# Patient Record
Sex: FEMALE | Race: WHITE
[De-identification: names, ages, dates, MRNs, and addresses within clinical notes are randomized per-mention and may not be internally consistent; named-entity substitution may affect disease eponyms.]

---

## 2017-06-29 ENCOUNTER — HOSPITAL ENCOUNTER (OUTPATIENT)
Dept: HOSPITAL 65 - LAB | Age: 77
Discharge: HOME | End: 2017-06-29
Attending: INTERNAL MEDICINE
Payer: MEDICARE

## 2017-06-29 DIAGNOSIS — Z13.220: Primary | ICD-10-CM

## 2017-06-29 DIAGNOSIS — Z13.29: ICD-10-CM

## 2017-06-29 DIAGNOSIS — Z85.3: ICD-10-CM

## 2017-06-29 DIAGNOSIS — I10: ICD-10-CM

## 2017-06-29 LAB
ALP INTEST CFR SERPL: 40 U/L (ref 50–136)
ALT SERPL-CCNC: 60 U/L (ref 12–78)
AST SERPL-CCNC: 38 U/L (ref 0–35)
BASOPHILS # BLD AUTO: 0.1 10^3/UL (ref 0–0.1)
BASOPHILS NFR BLD AUTO: 1 % (ref 0–0.2)
CALCIUM SERPL-MCNC: 9.9 MG/DL (ref 8.4–10.5)
CHOLEST SERPL-MCNC: 270 MG/DL (ref 120–240)
CO2 BLDA-SCNC: 35.5 MMOL/L (ref 20–32)
EOSINOPHIL # BLD AUTO: 0.2 10^3/UL (ref 0–0.2)
EOSINOPHIL NFR BLD AUTO: 2.9 % (ref 0–5)
ERYTHROCYTE [DISTWIDTH] IN BLOOD BY AUTOMATED COUNT: 13.5 % (ref 11.5–14.5)
GLUCOSE PRE 100 G GLC PO SERPL-MCNC: 112 MG/DL (ref 70–110)
HCT VFR BLD AUTO: 48.1 % (ref 36–46)
HDLC SERPL-MCNC: 60 MG/DL (ref 32–96)
HGB BLD-MCNC: 15.9 G/DL (ref 12–15)
LYMPHOCYTES # BLD AUTO: 2 10^3/UL (ref 1–4.8)
LYMPHOCYTES NFR BLD AUTO: 32.7 % (ref 24–44)
MCH RBC QN AUTO: 28.3 PG (ref 26–34)
MCHC RBC AUTO-ENTMCNC: 33.1 G/DL (ref 33–37)
MCV RBC AUTO: 85.6 FL (ref 78–100)
MONOCYTES # BLD AUTO: 0.6 10^3/UL (ref 0.3–0.8)
MONOCYTES NFR BLD AUTO: 9.7 % (ref 5–12)
NEUTROPHILS # BLD AUTO: 3.3 10^3/UL (ref 1.8–7.7)
NEUTROPHILS NFR BLD AUTO: 53.5 % (ref 41–85)
PLATELET # BLD AUTO: 281 10^3/UL (ref 150–400)
PMV BLD AUTO: 9.8 FL (ref 7.8–11)
WBC # BLD AUTO: 6.2 10^3/UL (ref 4.5–11)

## 2017-06-29 PROCEDURE — 85025 COMPLETE CBC W/AUTO DIFF WBC: CPT

## 2017-06-29 PROCEDURE — 80061 LIPID PANEL: CPT

## 2017-06-29 PROCEDURE — 84439 ASSAY OF FREE THYROXINE: CPT

## 2017-06-29 PROCEDURE — 36415 COLL VENOUS BLD VENIPUNCTURE: CPT

## 2017-06-29 PROCEDURE — 84443 ASSAY THYROID STIM HORMONE: CPT

## 2017-06-29 PROCEDURE — 93005 ELECTROCARDIOGRAM TRACING: CPT

## 2017-06-29 PROCEDURE — 81002 URINALYSIS NONAUTO W/O SCOPE: CPT

## 2017-06-29 PROCEDURE — 80053 COMPREHEN METABOLIC PANEL: CPT

## 2017-06-29 PROCEDURE — 83036 HEMOGLOBIN GLYCOSYLATED A1C: CPT

## 2017-07-03 LAB
APPEARANCE UR: CLEAR
BILIRUB UR STRIP.AUTO-MCNC: NEGATIVE MG/DL
COLOR UR: YELLOW
UROBILINOGEN UR QL STRIP.AUTO: NORMAL

## 2017-09-05 ENCOUNTER — HOSPITAL ENCOUNTER (OUTPATIENT)
Dept: HOSPITAL 65 - RAD | Age: 77
Discharge: HOME | End: 2017-09-05
Attending: INTERNAL MEDICINE
Payer: MEDICARE

## 2017-09-05 VITALS — HEIGHT: 60 IN | WEIGHT: 130 LBS | BODY MASS INDEX: 25.52 KG/M2

## 2017-09-05 DIAGNOSIS — R94.31: ICD-10-CM

## 2017-09-05 DIAGNOSIS — J44.9: Primary | ICD-10-CM

## 2017-09-05 DIAGNOSIS — I25.10: ICD-10-CM

## 2017-09-05 PROCEDURE — A9500 TC99M SESTAMIBI: HCPCS

## 2017-09-05 PROCEDURE — 93307 TTE W/O DOPPLER COMPLETE: CPT

## 2017-09-05 PROCEDURE — 93017 CV STRESS TEST TRACING ONLY: CPT

## 2017-09-05 PROCEDURE — 78452 HT MUSCLE IMAGE SPECT MULT: CPT

## 2017-09-06 NOTE — STRESS
DATE OF SERVICE:  2017



INDICATIONS:  A 77-year-old lady with history of coronary artery disease, prior

heart catheterization, abnormal EKG, COPD, hypertension and type 2 diabetes

mellitus, has been symptomatic with shortness of breath.  Stress test was

recommended to rule out coronary ischemic etiology.



DESCRIPTION OF PROCEDURE:  The patient presented to the Stress Lab in a fasting

condition, she is signed a proper consent.  Baseline blood pressure 145/88,

heart rate of 83.  The patient was asymptomatic.  EKG showed normal sinus rhythm

with nonspecific ST-T wave changes.



The patient was injected 0.4 mg of regadenoson followed by the stress dose of

technetium sestamibi.  One minute post-infusion blood pressure was 129/59, heart

rate of 95.  The patient developed some mild dizziness and headache.  No chest

pain or shortness of breath.  Recovery phase was uneventful.  No EKG changes of

ischemia were noted and no arrhythmia was seen.



Myocardial perfusion imaging study was performed using technetium sestamibi in

the same day stress/rest protocol showing the followin.  Study quality was good.

2.  Attenuation artifacts were corrected, prone position was available.

3.  SPECT perfusion imaging study showed normal perfusion scan, no evidence of

perfusion defects, ischemia or scarring.

4.  Gated function study showed normal wall motion and wall thickening.  EF was

70%, stroke volume 25 mL.  No wall motion abnormality was seen.



IMPRESSION:

1.  Nondiagnostic stress portion of LexiScan.

2.  No EKG changes of ischemia.

3.  Normal hemodynamic response.

4.  No arrhythmia.

5.  Myocardial perfusion imaging study was normal without any perfusion defects,

ischemia or scarring.

6.  Gated function study showed normal wall motion and wall thickening.  No

evidence of any wall motion abnormality.  EF was 70%.

7.  The study qualifies for low risk for obstructive coronary artery disease by

myocardial perfusion imaging criteria.





______________________________

Vannesa Marina MD



DR:  MB/mami  JOB# [id number]  [id number]

DD:  2017 08:34  DT:  2017 11:13

## 2017-11-06 ENCOUNTER — HOSPITAL ENCOUNTER (OUTPATIENT)
Dept: HOSPITAL 65 - CT | Age: 77
Discharge: HOME | End: 2017-11-06
Attending: INTERNAL MEDICINE
Payer: MEDICARE

## 2017-11-06 DIAGNOSIS — I73.9: ICD-10-CM

## 2017-11-06 DIAGNOSIS — I71.4: Primary | ICD-10-CM

## 2017-11-06 PROCEDURE — 36415 COLL VENOUS BLD VENIPUNCTURE: CPT

## 2017-11-06 PROCEDURE — 82565 ASSAY OF CREATININE: CPT

## 2017-11-06 PROCEDURE — 93922 UPR/L XTREMITY ART 2 LEVELS: CPT

## 2017-11-06 PROCEDURE — 74174 CTA ABD&PLVS W/CONTRAST: CPT

## 2017-11-06 NOTE — DIREP
PROCEDURE:CT ABD/PELVIS WITH CONTRAST

 

TECHNIQUE:No oral contrast was given.  Following the intravenous 

administration of contrast material, axial cuts were obtained from the dome of 

the diaphragm to the ischial tuberosities.  The images were viewed at lung, 

liver, bone, and soft tissue settings.  Sagittal and coronal reconstructions 

are provided.   

 

COMPARISON:CT, CT-CHEST W/O CONTRAST, 08/02/2012, 02:52 PM.

 

INDICATIONS:I71.4 ABD AORTIC ANEURYSM

 

FINDINGS:

LOWER CHEST:Stable nodules and scarring in the left lung base.  Small hiatal 

hernia.  Stable pericardial effusion

LIVER:Normal.

BILIARY:Normal.

PANCREAS:Normal.

SPLEEN:Normal.

URINARY TRACT:Normal.

ADRENALS:Normal.

AORTA/VASCULAR:Moderate atheromatous calcifications.

RETROPERITONEUM:Normal.

BOWEL/MESENTERY:Bowel evaluation is limited by the lack of oral contrast.  No 

evidence of bowel obstruction, free intraperitoneal air, or abscess.  The 

appendix is not visualized; however, no adjacent inflammatory changes are 

present to suggest acute appendicitis.

ABDOMINAL WALL:Normal.

PELVIS:Large suspected uterine fibroid.

BONES:Left hip arthroplasty.  Orthopedic hardware in the lower lumbar spine.

OTHER:Normal.

 

CONCLUSION:

 

No acute intra-abdominal process demonstrated.

 

No evidence of aneurysm.

 

Stable chronic changes in the lung base with a small pericardial effusion

 

 

 

Dictated by: CHRISTIANO Diego M.D. on 11/06/2017 at 03:54 PM     

Electronically Signed By: CHRISTIANO Diego M.D. on 11/06/2017 at 04:02 PM

## 2017-11-07 NOTE — DIREP
PROCEDURE:US ANKLE BRACHIAL INDEX

 

COMPARISON:None.

 

INDICATIONS:I73.9 PVD

 

TECHNIQUE:A color duplex Doppler ultrasound examination of the bilateral lower 

extremities was performed.  Color image and bidirectional spectral Doppler wave 

form analysis, and peak systolic flow measurements of the posterior tibial and 

dorsalis pedis arteries were performed.  

 

FINDINGS:

 

RIGHT LOWER EXTREMITY:  KALLIE DP:  1.2.  PT:  1.1.

POSTERIOR TIBIAL:Velocity was not reliably measuredTriphasic

DORSALIS PEDIS:70.2 cm/sTriphasic

 

LEFT LOWER EXTREMITY:   KALLIE DP:  1.2.  PT:  1.0.

POSTERIOR TIBIAL:44.6 cm/sTriphasic

DORSALIS PEDIS:Velocities is not reliably measuredTriphasic

 

 

CONCLUSION:Normal ankle-brachial indices as detailed above.

 

ABIs greater than 1.4 indicate noncompressible vessels, likely to have 

significant peripheral vascular disease (PVD).

ABIs of 0.91 to 1.3 indicate no significant obstructive disease.

ABIs of 0.41 to 0.90 indicate grade I claudication.

ABIs less than 0.4 indicate limb-threatening ischemia of grade I or grade II.

 

 

 

 

 

 

Dictated by: LEATHA Physician on 11/06/2017 at 03:24 PM     

Electronically Signed By: Seamus Jerome DO on 11/07/2017 at 08:40 AM   

   

 

 

 

ac

## 2018-03-24 ENCOUNTER — HOSPITAL ENCOUNTER (EMERGENCY)
Dept: HOSPITAL 65 - ER | Age: 78
Discharge: ANOTHER HOSPITAL | End: 2018-03-24
Payer: MEDICARE

## 2018-03-24 VITALS — SYSTOLIC BLOOD PRESSURE: 162 MMHG | DIASTOLIC BLOOD PRESSURE: 78 MMHG

## 2018-03-24 VITALS — SYSTOLIC BLOOD PRESSURE: 166 MMHG | DIASTOLIC BLOOD PRESSURE: 80 MMHG

## 2018-03-24 VITALS — HEIGHT: 60 IN | WEIGHT: 119 LBS | BODY MASS INDEX: 23.36 KG/M2

## 2018-03-24 VITALS — SYSTOLIC BLOOD PRESSURE: 171 MMHG | DIASTOLIC BLOOD PRESSURE: 55 MMHG

## 2018-03-24 DIAGNOSIS — J44.9: ICD-10-CM

## 2018-03-24 DIAGNOSIS — I10: ICD-10-CM

## 2018-03-24 DIAGNOSIS — E78.00: ICD-10-CM

## 2018-03-24 DIAGNOSIS — R07.9: Primary | ICD-10-CM

## 2018-03-24 DIAGNOSIS — E11.9: ICD-10-CM

## 2018-03-24 LAB
ALP INTEST CFR SERPL: 46 U/L (ref 50–136)
ALT SERPL-CCNC: 26 U/L (ref 12–78)
AST SERPL-CCNC: 21 U/L (ref 0–35)
BASOPHILS # BLD AUTO: 0 10^3/UL (ref 0–0.1)
BASOPHILS NFR BLD AUTO: 0.4 % (ref 0–0.2)
CALCIUM SERPL-MCNC: 9.4 MG/DL (ref 8.4–10.5)
CO2 BLDA-SCNC: 32.5 MMOL/L (ref 20–32)
EOSINOPHIL # BLD AUTO: 0.3 10^3/UL (ref 0–0.2)
EOSINOPHIL NFR BLD AUTO: 3.5 % (ref 0–5)
ERYTHROCYTE [DISTWIDTH] IN BLOOD BY AUTOMATED COUNT: 13.9 % (ref 11.5–14.5)
GLUCOSE PRE 100 G GLC PO SERPL-MCNC: 116 MG/DL (ref 70–110)
HGB BLD-MCNC: 14.3 G/DL (ref 12–15)
LYMPHOCYTES # BLD AUTO: 2.3 10^3/UL (ref 1–4.8)
LYMPHOCYTES NFR BLD AUTO: 25.2 % (ref 24–44)
MCH RBC QN AUTO: 28.3 PG (ref 26–34)
MCHC RBC AUTO-ENTMCNC: 32.2 G/DL (ref 33–37)
MCV RBC AUTO: 87.7 FL (ref 78–100)
MONOCYTES # BLD AUTO: 0.8 10^3/UL (ref 0.3–0.8)
MONOCYTES NFR BLD AUTO: 8.5 % (ref 5–12)
NEUTROPHILS # BLD AUTO: 5.6 10^3/UL (ref 1.8–7.7)
NEUTROPHILS NFR BLD AUTO: 62.3 % (ref 41–85)
PLATELET # BLD AUTO: 285 10^3/UL (ref 150–400)
PMV BLD AUTO: 9.9 FL (ref 7.8–11)
WBC # BLD AUTO: 9 10^3/UL (ref 4.5–11)

## 2018-03-24 PROCEDURE — 84484 ASSAY OF TROPONIN QUANT: CPT

## 2018-03-24 PROCEDURE — 99285 EMERGENCY DEPT VISIT HI MDM: CPT

## 2018-03-24 PROCEDURE — 85025 COMPLETE CBC W/AUTO DIFF WBC: CPT

## 2018-03-24 PROCEDURE — 80053 COMPREHEN METABOLIC PANEL: CPT

## 2018-03-24 PROCEDURE — 85610 PROTHROMBIN TIME: CPT

## 2018-03-24 PROCEDURE — 82550 ASSAY OF CK (CPK): CPT

## 2018-03-24 PROCEDURE — 94640 AIRWAY INHALATION TREATMENT: CPT

## 2018-03-24 PROCEDURE — 71045 X-RAY EXAM CHEST 1 VIEW: CPT

## 2018-03-24 PROCEDURE — 85379 FIBRIN DEGRADATION QUANT: CPT

## 2018-03-24 PROCEDURE — 82553 CREATINE MB FRACTION: CPT

## 2018-03-24 PROCEDURE — 36415 COLL VENOUS BLD VENIPUNCTURE: CPT

## 2018-03-24 PROCEDURE — 85730 THROMBOPLASTIN TIME PARTIAL: CPT

## 2018-03-24 PROCEDURE — 96372 THER/PROPH/DIAG INJ SC/IM: CPT

## 2018-03-24 PROCEDURE — 83880 ASSAY OF NATRIURETIC PEPTIDE: CPT

## 2018-03-24 PROCEDURE — 93005 ELECTROCARDIOGRAM TRACING: CPT

## 2018-03-24 NOTE — ER.PDOC
General


Chief Complaint:  Chest Pain-Cardiac Nature


Stated Complaint:  CHEST PAIN


Time seen by MD:  20:39


Source:  patient


Exam Limitations:  no limitations





History of Present Illness


Initial Comments


Left chest pain intermittently since this afternoon


Timing/Duration:  4-6 hours


Severity/Quality:  moderate, sharp


Radiation:  no radiation


Activities at Onset:  rest


Prior CP/Workup:  No Prior Chest Pain


Aspirin Today:  325 mg x 1


Associated Symptoms:  shortness of breath


Allergies:  


Coded Allergies:  


     No Known Allergies (Unverified , 1/3/14)





Past Medical History


Medical History:  COPD, diabetes, high cholesterol, hypertension


Surgical History:  appendectomy, back, hip, tonsillectomy, tubal





Social History


Smoking:  non-smoker


Alcohol Use:  none


Drug Use:  none





Constitutional:  no symptoms reported


Respiratory:  no symptoms reported


Cardiovascular:  see HPI


Gastrointestinal:  no symptoms reported


Genitourinary:  no symptoms reported


Musculoskeletal:  no symptoms reported


All Other Systems:  Reviewed and Negative





Physical Exam


General Appearance:  No Apparent Distress, WD/WN


HEENT:  PERRL/EOMI


Neck:  Non-Tender, Full Range of Motion, Supple, Normal Inspection


Respiratory:  chest non-tender, lungs clear, normal breath sounds, no 

respiratory distress, no accessory muscle use


Cardiovascular:  Normal Peripheral Pulses, Regular Rate, Rhythm, No Edema, No 

Gallop, No JVD, No Murmur


Gastrointestinal:  Normal Bowel Sounds, No Organomegaly, No Pulsatile Mass, Non 

Tender, Soft


Extremities:  Normal Range of Motion, Non-Tender, Normal Inspection, No Pedal 

Edema, No Calf Tenderness, Normal Capillary Refill


Neurologic/Psychiatric:  CNs II-XII NML as Tested, No Motor/Sensory Deficits, 

Alert, Normal Mood/Affect, Oriented x 3


Skin:  Normal Color, Warm/Dry





Progress


Progress


Patient transferred to [facility] ED because patient does not want Dr. Sampson to 

take care of her and Dr. Castillo refused to admit patient because Dr. Sampson is 

the only Cardiologist to be consulted.





EKG/XRAY/CT/US


EKG Comments:  Normal


XRAY:  chest (Nothing acute)





Course


Blood Pressure Systolic:  166


Blood Pressure Diastolic:  80


Blood Pressure Mean:  108





Departure


Time of Disposition:  21:23


Disposition:  02 XFER SHT-TRM HOSP


Impression:  


 Primary Impression:  


 Chest pain


Condition:  Stable


Referrals:  


PCP,UNKNOWN (PCP)


PRIMARY CARE PROVIDER


Comments


Transfer to [facility] ED for Dr. Szymanski


Duration or Time Spent with Pa:  60 mins





Problem Qualifiers








 Primary Impression:  


 Chest pain


 Chest pain type:  unspecified  Qualified Codes:  R07.9 - Chest pain, 

unspecified








PRABHAKAR ELAINE MD Mar 24, 2018 20:41

## 2018-03-24 NOTE — NUR
DR. LANCE:



DR. LANCE AGREED TO ADMIT FOR CHEST PAIN IF CONSULT WITH DR. CERVANTES. PATIENT 
REFUSED CONSULT WITH DR. CERVANTES AND REQUESTED TO TRANSFER [place]. DR. ELAINE 
SPOKE WITH DR. LANCE. PATIENT TO BE TRANSFERRED TO [place] PER PATIENT REQUEST.

## 2018-03-24 NOTE — PCM.EKG
[facility]

                                       

Test Date:    2018               Test Time:    20:18:48

Pat Name:     SUSHANT CRAIG               Department:   

Patient ID:   [facility]-[id number]          Room:          

Gender:       F                        Technician:   MA

:          [id number]               Requested By: PRABHAKAR ELAINE

Order Number: [id number]            Reading MD:   Prabhakar ELAINE

                                 Measurements

Intervals                              Axis          

Rate:         82                       P:            69

TX:           188                      QRS:          71

QRSD:         76                       T:            66

QT:           376                                    

QTc:          439                                    

                           Interpretive Statements

Normal sinus rhythm

Normal ECG

No previous ECG available for comparison



Electronically Signed On 3- 6:27:28 CDT by Prabhakar ELAINE



Please click the below link to view image of tracing.

## 2018-03-24 NOTE — NUR
EMS:



EMS AT BEDSIDE, REPORT GIVEN. TRANSFERRED TO [facility] WITHOUT DIFFICULTY.

## 2018-03-24 NOTE — DIREP
PROCEDURE:CHEST 1 VIEW

 

COMPARISON:[facility], CR, XRAY CHEST 2 VWS, 09/19/2017, 05:14 

PM.

 

INDICATIONS:Chest pain

 

FINDINGS:

LUNGS/PLEURA:Ill-defined opacity at the left lung base is believed to likely 

largely reflect soft tissue attenuation artifact from the anterior chest wall.  

Underlying atelectasis or infiltrate is considered less likely.  Mild senescent 

changes.  No pneumothorax or sizable pleural effusion.

VASCULATURE:Normal.  Unremarkable pulmonary vasculature.

CARDIAC:The heart is not significantly enlarged.

MEDIASTINUM:Mediastinal contours are within normal limits with calcifications 

of the aorta.

BONES:No acute abnormality.  Degenerative changes of the spine.

OTHER:Negative.  

 

CONCLUSION:

1.  No acute cardiopulmonary abnormality suspected.

 

 

 

Dictated by: Carols Castillo M.D.  On 03/24/2018 at 08:45 PM     

Electronically Signed By: Carlos Castillo M.D. on 03/24/2018 at 08:47 PM

## 2019-01-14 ENCOUNTER — HOSPITAL ENCOUNTER (OUTPATIENT)
Dept: HOSPITAL 65 - RAD | Age: 79
Discharge: HOME | End: 2019-01-14
Attending: NURSE PRACTITIONER
Payer: MEDICARE

## 2019-01-14 DIAGNOSIS — M47.814: ICD-10-CM

## 2019-01-14 DIAGNOSIS — R05: Primary | ICD-10-CM

## 2019-01-14 PROCEDURE — 71046 X-RAY EXAM CHEST 2 VIEWS: CPT

## 2019-01-14 NOTE — DIREP
PROCEDURE:CHEST 2 VIEWS

 

COMPARISON:[facility], CR, XRAY CHEST SINGLE VW, 03/24/2018, 

08:27 PM.

 

INDICATIONS:R05 COUGH

 

FINDINGS:

LUNGS/PLEURA:No significant pulmonary parenchymal abnormalities. No effusions.

VASCULATURE:Normal.  Unremarkable pulmonary vasculature.

CARDIAC:The heart size remains upper limits of normal.

MEDIASTINUM:Normal.  No visible mass or adenopathy. 

BONES:Mild degenerative changes are seen in the thoracic spine.

OTHER:Negative.  

 

CONCLUSION:No active or acute cardiopulmonary disease is seen.

 

 

 

Dictated by: Samuel Rutherford M.D. on 01/14/2019 at 10:53 AM     

Electronically Signed By: Samuel Rutherford M.D. on 01/14/2019 at 10:54 AM

## 2019-01-21 ENCOUNTER — HOSPITAL ENCOUNTER (OUTPATIENT)
Dept: HOSPITAL 65 - CT | Age: 79
Discharge: HOME | End: 2019-01-21
Attending: NURSE PRACTITIONER
Payer: MEDICARE

## 2019-01-21 DIAGNOSIS — J84.115: ICD-10-CM

## 2019-01-21 DIAGNOSIS — I70.0: ICD-10-CM

## 2019-01-21 DIAGNOSIS — R91.8: Primary | ICD-10-CM

## 2019-01-21 DIAGNOSIS — J44.9: ICD-10-CM

## 2019-01-21 DIAGNOSIS — I31.3: ICD-10-CM

## 2019-01-21 PROCEDURE — 36415 COLL VENOUS BLD VENIPUNCTURE: CPT

## 2019-01-21 PROCEDURE — 71270 CT THORAX DX C-/C+: CPT

## 2019-01-21 PROCEDURE — 82565 ASSAY OF CREATININE: CPT

## 2019-01-21 NOTE — DIREP
PROCEDURE:CT CHEST W&W/O

 

COMPARISON:[facility], CT, CT-CHEST W/O CONTRAST, 04/04/2013, 

10:48 AM.  [facility], CR, XRAY CHEST 2 VWS, 01/14/2019, 10:13 AM.

 

INDICATIONS:J44.9 , R05 COUGH, J84.115 RESPIRATORY BRONCHIOLITIS

 

TECHNIQUE:Helical sections through the chest were performed from the lung 

apices through the diaphragms without IV contrast. Sagittal and coronal 

reconstructions are obtained from source images.

 

FINDINGS:

LUNGS:No consolidation.  No infiltrates detected.  Middle lobe nodules, the 

largest 6 mm in diameter (series 5/image 29) are the same or smaller than 2013. 

 Some peripheral anterior nodules are less than 5 mm in maximum diameter in the 

middle lobe on the right.

Focal irregular opacity in the left base is less than 7 mm in diameter, 

unchanged, series 5/image 43

7 mm opacity in the posterior sulcus on the left is smaller than 2013

PLEURA:No pleural effusion.  No pleural or diaphragmatic calcifications

CARDIAC:Pericardial effusion almost 2 cm thick along the anterior wall has 

increased since 2013

MEDIASTINUM:No adenopathy

SOFI:Normal.  No mass or adenopathy.  

AORTA:Atherosclerotic calcifications, no aneurysmal dilatation

CHEST WALL:Normal.  No mass or axillary adenopathy.  

LIMITED ABDOMEN:Normal.  Limited images of the upper abdomen are unremarkable. 

 

BONES:Normal.  No bony lesion or fracture.  

OTHER:Negative.  

 

CONCLUSION:Stable bilateral pulmonary nodules.  No focal infiltrates or 

pleural effusions.  Interval increase in pericardial effusion.

 

 

 

Dictated by: Han Meza MD on 01/21/2019 at 11:03 AM     

Electronically Signed By: Han Meza MD on 01/21/2019 at 11:13 AM

## 2019-12-02 ENCOUNTER — HOSPITAL ENCOUNTER (OUTPATIENT)
Dept: HOSPITAL 65 - RAD | Age: 79
Discharge: HOME | End: 2019-12-02
Attending: NURSE PRACTITIONER
Payer: MEDICARE

## 2019-12-02 DIAGNOSIS — J44.1: Primary | ICD-10-CM

## 2019-12-02 PROCEDURE — 71046 X-RAY EXAM CHEST 2 VIEWS: CPT

## 2019-12-18 ENCOUNTER — HOSPITAL ENCOUNTER (OUTPATIENT)
Dept: HOSPITAL 65 - RAD | Age: 79
Discharge: HOME | End: 2019-12-18
Attending: NURSE PRACTITIONER
Payer: MEDICARE

## 2019-12-18 DIAGNOSIS — R05: Primary | ICD-10-CM

## 2019-12-18 DIAGNOSIS — M47.819: ICD-10-CM

## 2019-12-18 PROCEDURE — 71046 X-RAY EXAM CHEST 2 VIEWS: CPT

## 2019-12-18 NOTE — DIREP
PROCEDURE:CHEST 2 VIEWS

 

COMPARISON:[facility], CR, XRAY CHEST 2 VWS, 12/02/2019, 09:42 

AM.

 

INDICATIONS:R05 COUGH

 

FINDINGS:

LUNGS/PLEURA:Senescent changes.  No suspicious airspace consolidation, pleural 

effusion or pneumothorax is identified.

VASCULATURE:Normal.  Unremarkable pulmonary vasculature.

CARDIAC:Normal.  No cardiac silhouette abnormality or cardiomegaly.  

MEDIASTINUM:Mediastinal contours appear within acceptable limits with 

calcifications of the aorta.

BONES:Mild degenerative changes of the spine.  No acute abnormality.

OTHER:Negative.  

 

CONCLUSION:

1.  Senescent changes.  No acute cardiopulmonary abnormality.

 

 

 

Dictated by: Carlos Castillo M.D.  On 12/18/2019 at 09:52 AM     

Electronically Signed By: Carlos Castillo M.D. on 12/18/2019 at 09:54 AM

## 2020-05-17 ENCOUNTER — HOSPITAL ENCOUNTER (OUTPATIENT)
Dept: HOSPITAL 65 - ER | Age: 80
Setting detail: OBSERVATION
LOS: 1 days | Discharge: LEFT BEFORE DISCHARGE | End: 2020-05-18
Attending: INTERNAL MEDICINE | Admitting: INTERNAL MEDICINE
Payer: MEDICARE

## 2020-05-17 VITALS — DIASTOLIC BLOOD PRESSURE: 68 MMHG | SYSTOLIC BLOOD PRESSURE: 131 MMHG

## 2020-05-17 VITALS — DIASTOLIC BLOOD PRESSURE: 68 MMHG | SYSTOLIC BLOOD PRESSURE: 140 MMHG

## 2020-05-17 VITALS — HEIGHT: 60 IN | BODY MASS INDEX: 24.05 KG/M2 | WEIGHT: 122.5 LBS

## 2020-05-17 VITALS — SYSTOLIC BLOOD PRESSURE: 130 MMHG | DIASTOLIC BLOOD PRESSURE: 65 MMHG

## 2020-05-17 VITALS — DIASTOLIC BLOOD PRESSURE: 68 MMHG | SYSTOLIC BLOOD PRESSURE: 152 MMHG

## 2020-05-17 DIAGNOSIS — Z87.01: ICD-10-CM

## 2020-05-17 DIAGNOSIS — J44.9: ICD-10-CM

## 2020-05-17 DIAGNOSIS — R07.2: Primary | ICD-10-CM

## 2020-05-17 DIAGNOSIS — E11.9: ICD-10-CM

## 2020-05-17 DIAGNOSIS — Z79.899: ICD-10-CM

## 2020-05-17 DIAGNOSIS — I20.0: ICD-10-CM

## 2020-05-17 DIAGNOSIS — Z95.828: ICD-10-CM

## 2020-05-17 DIAGNOSIS — E78.00: ICD-10-CM

## 2020-05-17 DIAGNOSIS — M10.9: ICD-10-CM

## 2020-05-17 DIAGNOSIS — Z79.84: ICD-10-CM

## 2020-05-17 DIAGNOSIS — Z99.81: ICD-10-CM

## 2020-05-17 DIAGNOSIS — Z90.89: ICD-10-CM

## 2020-05-17 DIAGNOSIS — I10: ICD-10-CM

## 2020-05-17 LAB
ALP INTEST CFR SERPL: 49 U/L (ref 50–136)
ALT SERPL-CCNC: 20 U/L (ref 12–78)
AST SERPL-CCNC: 21 U/L (ref 0–35)
BASOPHILS # BLD AUTO: 0.1 10^3/UL (ref 0–0.1)
BASOPHILS NFR BLD AUTO: 0.6 % (ref 0–0.2)
CALCIUM SERPL-MCNC: 9.4 MG/DL (ref 8.4–10.5)
CO2 BLDA-SCNC: 31.6 MMOL/L (ref 20–32)
EOSINOPHIL # BLD AUTO: 0.2 10^3/UL (ref 0–0.2)
EOSINOPHIL NFR BLD AUTO: 2.3 % (ref 0–5)
ERYTHROCYTE [DISTWIDTH] IN BLOOD BY AUTOMATED COUNT: 12.9 % (ref 11.5–14.5)
GLUCOSE PRE 100 G GLC PO SERPL-MCNC: 107 MG/DL (ref 70–110)
LYMPHOCYTES # BLD AUTO: 2.14 10^3/UL1 (ref 1–4.8)
LYMPHOCYTES NFR BLD AUTO: 23.8 % (ref 24–44)
MCH RBC QN AUTO: 28.5 PG (ref 26–34)
MONOCYTES # BLD AUTO: 0.7 10^3/UL (ref 0.3–0.8)
MONOCYTES NFR BLD AUTO: 7.9 % (ref 5–12)
NEUTROPHILS # BLD AUTO: 5.9 10^3/UL (ref 1.8–7.7)
NEUTROPHILS NFR BLD AUTO: 65.4 % (ref 41–85)
PLATELET # BLD AUTO: 263 10^3/UL (ref 150–400)

## 2020-05-17 PROCEDURE — 85025 COMPLETE CBC W/AUTO DIFF WBC: CPT

## 2020-05-17 PROCEDURE — A9500 TC99M SESTAMIBI: HCPCS

## 2020-05-17 PROCEDURE — 84484 ASSAY OF TROPONIN QUANT: CPT

## 2020-05-17 PROCEDURE — G0378 HOSPITAL OBSERVATION PER HR: HCPCS

## 2020-05-17 PROCEDURE — 99285 EMERGENCY DEPT VISIT HI MDM: CPT

## 2020-05-17 PROCEDURE — 85379 FIBRIN DEGRADATION QUANT: CPT

## 2020-05-17 PROCEDURE — 82553 CREATINE MB FRACTION: CPT

## 2020-05-17 PROCEDURE — 94640 AIRWAY INHALATION TREATMENT: CPT

## 2020-05-17 PROCEDURE — 93005 ELECTROCARDIOGRAM TRACING: CPT

## 2020-05-17 PROCEDURE — 83880 ASSAY OF NATRIURETIC PEPTIDE: CPT

## 2020-05-17 PROCEDURE — 78452 HT MUSCLE IMAGE SPECT MULT: CPT

## 2020-05-17 PROCEDURE — 82550 ASSAY OF CK (CPK): CPT

## 2020-05-17 PROCEDURE — 84443 ASSAY THYROID STIM HORMONE: CPT

## 2020-05-17 PROCEDURE — 84439 ASSAY OF FREE THYROXINE: CPT

## 2020-05-17 PROCEDURE — 71250 CT THORAX DX C-: CPT

## 2020-05-17 PROCEDURE — 96372 THER/PROPH/DIAG INJ SC/IM: CPT

## 2020-05-17 PROCEDURE — 85610 PROTHROMBIN TIME: CPT

## 2020-05-17 PROCEDURE — 93017 CV STRESS TEST TRACING ONLY: CPT

## 2020-05-17 PROCEDURE — 36415 COLL VENOUS BLD VENIPUNCTURE: CPT

## 2020-05-17 PROCEDURE — 93306 TTE W/DOPPLER COMPLETE: CPT

## 2020-05-17 PROCEDURE — 71045 X-RAY EXAM CHEST 1 VIEW: CPT

## 2020-05-17 PROCEDURE — 85730 THROMBOPLASTIN TIME PARTIAL: CPT

## 2020-05-17 PROCEDURE — 82948 REAGENT STRIP/BLOOD GLUCOSE: CPT

## 2020-05-17 PROCEDURE — 80053 COMPREHEN METABOLIC PANEL: CPT

## 2020-05-17 NOTE — PCM.HP
HISTORY & PHYSICAL


HISTORY & PHYSICAL


DATE: May 17, 2020





Patient is placed under observation to [facility]





ADMITTING DIAGNOSES: Chest pain with type 2 diabetes mellitus





CHIEF COMPLAINT: Chest pain





HISTORY OF PRESENT ILLNESS: 80-year-old female who has been having intermittent 

left back pain that radiates to her left side of the chest that has been going 

on and off for several weeks now.  She states this all started weeks ago when 

she was just at her desk doing computer work and she started to have this pain. 

There is no associated diaphoresis, no nausea no vomiting, no fever no chills, 

no radiation of the pain elsewhere.  She denies any significant coughing and she

has no shortness of breath.  She states that when she takes a deep breath she 

does have this pain that comes to this area.  He denies any syncope and no 

recent travel and no sick contacts reported.


   She was hospitalized in 2011 and had complicated pneumonia and was shipped to

[place] and she had a chest tube placed on the left side.  She was wearing 

oxygen before this and continue to wear oxygen afterwards.





PAST MEDICAL HISTORY: Type 2 diabetes mellitus history of pneumonia with 

complications, COPD on oxygen, hypertension, gout





PAST SURGICAL HISTORY: Left hip surgery, tonsillectomy, appendectomy, heart 

cath, sciatica surgery, chest tube placement on the left side





ALLERGIES: No known drug allergies





MEDICATIONS: I have reviewed her home medication list in [email or web address]





SOCIAL HISTORY: No alcohol, no drug use, no tobacco use





FAMILY HISTORY: Noncontributory for this admission





PHYSICAL EXAMINATION:


VITAL SIGNS: Temperature 97.9, pulse 83, respirations 16, blood pressure 131/68,

O2 sat 94%


HEENT: Oropharynx is clear, moist mucous membranes


NECK: Supple, no JVD


HEART: 2 out of 6 systolic murmur at the left anterior chest


LUNGS: Decreased breath sounds at the bases of her lungs


ABDOMEN: Positive bowel sounds, soft abdomen, no masses


EXTREMITIES: No cyanosis, no petechia/purpura





LABORATORY DATA: CBC normal, coags normal, d-dimer 0.7, chemistry panel looks 

good, LFTs normal, troponin I less than 0.02, 





Chest x-ray: The left costophrenic angle is not well seen





ASSESSMENT: We had this elderly female with chest pain and possible pleurisy





PLAN: I will get a CT scan of the chest and get serial cardiac enzymes with a 

repeat EKG in the morning and have cardiology consult on her in the morning as w

elin.











DORIAN SANTOS MD              May 17, 2020 22:59

## 2020-05-17 NOTE — PCM.EKG
[facility]

                                       

Test Date:    2020               Test Time:    17:32:42

Pat Name:     SUSHANT CRAIG               Department:   

Patient ID:   [id number]          Room:         340

Gender:       F                        Technician:   RADHA

:          1940               Requested By: PRABHAKAR ELAINE

Order Number: [id number]           Reading MD:   Prabhakar ELAINE

                                 Measurements

Intervals                              Axis          

Rate:         77                       P:            93

MO:           185                      QRS:          75

QRSD:         87                       T:            72

QT:           376                                    

QTc:          426                                    

                           Interpretive Statements

Sinus rhythm

Abnormal R-wave progression, late transition

Borderline T abnormalities, anterior leads

Compared to ECG 2018 20:18:48

T-wave abnormality now present



Electronically Signed On 2020 5:56:27 CDT by Prabhakar ELAINE



Please click the below link to view image of tracing.

## 2020-05-17 NOTE — ER.PDOC
General


Chief Complaint:  Chest Pain-Cardiac Nature


Stated Complaint:  CHEST PAIN


Time seen by MD:  18:01


Source:  patient


Exam Limitations:  no limitations





History of Present Illness


Initial Comments


Chest pain for 3 days on and off. Pain is located on left chest.


Timing/Duration:  intermittent, gone now


Severity/Quality:  moderate, tightness


Radiation:  no radiation


Prior CP/Workup:  No Prior Chest Pain


Nitro Today/Relief:  No Nitro Taken Today


Aspirin Today:  81 mg x 3, Provided At Home


Associated Symptoms:  shortness of breath


Allergies:  


Coded Allergies:  


     No Known Allergies (Unverified , 1/3/14)


Home Meds


Reported Medications


Losartan Potassium (LOSARTAN POTASSIUM) 25 Mg Tablet, 1 TAB PO DAILY, #90 TAB 1 

Refill


   5/17/20


Metformin Hcl (METFORMIN HCL) 500 Mg Tablet, 500 MG PO DAILY24, TAB


   5/17/20


Atorvastatin 10MG (LIPITOR 10MG) 10 Mg Tablet, 1 TAB PO HS, #90 TAB 1 Refill


   5/17/20


Allopurinol (ALLOPURINOL) 100 Mg Tablet, 1 TAB PO DAILY, #30 TAB 5 Refills


   5/17/20


Amlodipine Besylate (AMLODIPINE BESYLATE) 5 Mg Tablet, 1 TAB PO DAILY, #30 TAB 5

Refills


   5/17/20


Hydrochlorothiazide (HYDROCHLOROTHIAZIDE) 25 Mg Tablet, 1 TAB PO DAILY, #30 TAB 

5 Refills


   5/17/20


Albuterol Sulfate (ALBUTEROL SULFATE) 2 Mg/5 Ml Syrup, 2 MG PO PRN, ML


   5/17/20





Past Medical History


Medical History:  COPD, diabetes, high cholesterol, hypertension, other


Surgical History:  appendectomy, tonsillectomy





Social History


Alcohol Use:  none


Drug Use:  none





Constitutional:  no symptoms reported


Respiratory:  see HPI


Cardiovascular:  see HPI


Gastrointestinal:  no symptoms reported


Genitourinary:  no symptoms reported


All Other Systems:  Reviewed and Negative





Physical Exam


General Appearance:  No Apparent Distress, WD/WN


Neck:  Non-Tender, Full Range of Motion, Supple, Normal Inspection


Respiratory:  chest non-tender, lungs clear, normal breath sounds, no 

respiratory distress, no accessory muscle use


Cardiovascular:  Normal Peripheral Pulses, Regular Rate, Rhythm, No Edema, No 

Gallop, No JVD, No Murmur


Gastrointestinal:  Normal Bowel Sounds, No Organomegaly, No Pulsatile Mass, Non 

Tender, Soft


Extremities:  Normal Range of Motion, Non-Tender, Normal Inspection, No Pedal 

Edema, No Calf Tenderness, Normal Capillary Refill


Neurologic/Psychiatric:  CNs II-XII NML as Tested, No Motor/Sensory Deficits, 

Alert, Normal Mood/Affect, Oriented x 3


Skin:  Normal Color, Warm/Dry





Results/Orders


Results/Orders





Orders - PRABHAKAR ELAINE MD


Cbc With Auto Diff (5/17/20 17:37)


Comprehensive Metabolic Panel (5/17/20 17:37)


Creatine Kinase (5/17/20 17:37)


Creatine Kinase Mb (5/17/20 17:37)


Troponin I (5/17/20 17:37)


Probnp    B-Type Np (5/17/20 17:37)


PT (5/17/20 17:37)


Partial Thromboplastin Time. (5/17/20 17:37)


D-Dimer (5/17/20 17:37)


Xr Chest 1v (5/17/20 17:37)


Ekg-Routine (5/17/20 17:37)





Vital Signs








  Date Time  Temp Pulse Resp B/P (MAP) Pulse Ox O2 Delivery O2 Flow Rate FiO2


 


5/17/20 18:00 97.9 83 16 131/68 (89) 94 Room Air  


 


5/17/20 17:53 97.9 83 16     


 


5/17/20 17:53 97.9 83 16  94   








                                Laboratory Tests








Test


 5/17/20


17:45


 


White Blood Count


 9.0 10^3/uL


(4.5-11.0)


 


Red Blood Count


 4.92 10^6/uL


(4.00-5.20)


 


Hemoglobin


 14.0 g/dL


(12.0-15.0)


 


Hematocrit


 43.0 %


(36.0-46.0)


 


Mean Corpuscular Volume


 87.4 fL


()


 


Mean Corpuscular Hemoglobin


 28.5 pg


(26-34)


 


Mean Corpuscular Hemoglobin


Concent 32.6 g/dL


(33-36.5)  L


 


Red Cell Distribution Width


 12.9 %


(11.5-14.5)


 


Platelet Count


 263 10^3/uL


(150-400)


 


Mean Platelet Volume


 9.5 fL


(7.8-11.0)


 


Neutrophils (%) (Auto)


 65.4 %


(41.0-85.0)


 


Lymphocytes (%) (Auto)


 23.8 %


(24.0-44.0)  L


 


Monocytes (%) (Auto)


 7.9 %


(5.0-12.0)


 


Neutrophils # (Auto)


 5.9 10^3/uL


(1.8-7.7)


 


Lymphocytes # (Auto)


 2.14 10^3/uL1


(1.0-4.8)


 


Monocytes # (Auto)


 0.7 10^3/uL


(0.3-0.8)


 


Absolute Immature Granulocyte


(auto 0 10^3 u/L


(0-2)


 


Absolute Eosinophils (auto)


 0.2 10^3/uL


(0.0-0.2)


 


Immature Granulocytes %


 0.00 %


(0.00-0.50)


 


Eosinophils %


 2.3 %


(0.0-5.0)


 


Basophils %


 0.6 %


(0.0-0.2)  H


 


Basophils #


 0.1 10^3/uL


(0.0-0.1)


 


Prothrombin Time


 9.5 SEC


(9.3-11.3)


 


Prothrombin Time INR


(Non-Therap) 0.9  





 


Activated Partial


Thromboplast Time 25.4 SEC


(24.67-30.72)


 


D-Dimer


 0.70 mg/L


(0.19-0.49)  *H


 


Sodium Level


 140 mmol/L


(132-145)


 


Potassium Level


 3.9 mmol/L


(3.6-5.2)


 


Chloride Level


 102.0 mmol/L


()


 


Carbon Dioxide Level


 31.6 mmol/L


(20.0-32)


 


Anion Gap 10.3  


 


Blood Urea Nitrogen


 17 mg/dL


(7-18)


 


Creatinine


 0.70 mg/dL


(0.59-1.40)


 


Estimated GFR (


American) 97.4 (>/=60)  





 


Est GFR (CKD-EPI)(Non-Afr


American) 80.5 (>/=60)  





 


BUN/Creatinine Ratio 24.0  


 


Glucose Level


 107 mg/dL


()


 


Calcium Level


 9.4 mg/dL


(8.4-10.5)


 


Total Bilirubin


 0.2 mg/dL


(0.2-1.0)


 


Aspartate Amino Transferase


(AST) 21 U/L (0-35)  





 


Alanine Aminotransferase (ALT)


 20 U/L (12-78)





 


Alkaline Phosphatase


 49 U/L


()  L


 


Total Creatine Kinase


 60 U/L


()


 


Creatine Kinase MB


 1.2 ng/mL


(0.5-3.6)


 


Troponin I


 < 0.02 ng/mL


(0.00-0.05)


 


Pro-B-Type Natriuretic Peptide


 193 pg/mL


(0-450)


 


Total Protein


 7.3 g/dL


(6.4-8.2)


 


Albumin


 3.7 g/dL


(3.4-5.0)


 


Globulin 3.6  











EKG/XRAY/CT/US


EKG:  NSR


XRAY:  chest (No active disease)





Departure


Time of Disposition:  18:42


Disposition:  01 HOME, SELF-CARE


Impression:  


   Primary Impression:  


   Unstable angina


Condition:  Stable


Referrals:  


KRISTAL BUCHANAN (PCP)


PRIMARY CARE PROVIDER


Comments


Admitted to Dr. Erazo


Duration or Time Spent with Pa:  60 min











PRABHAKAR ELAINE MD                May 17, 2020 18:03

## 2020-05-17 NOTE — DIREP
PROCEDURE:CT CHEST WITHOUT CONTRAST

 

TECHNIQUE:Axial cuts were obtained through the chest, without intravenous 

contrast material.  The images were viewed at lung and soft tissue settings.  

Sagittal and coronal reconstructions are provided.   

 

COMPARISON:CT, CT CHEST W&W/O, 01/21/2019, 09:37 AM.  [facility], , XRAY CHEST SINGLE VW, 05/17/2020, 05:53 PM.

 

INDICATIONS:chest pain, pleurisy

 

FINDINGS:

LUNGS:No consolidated infiltrate is seen.  Linear density is noted in the 

lingula of the left upper lobe most consistent with parenchymal scarring.  

Multiple pulmonary nodules are noted.  The largest nodule in the right lung is 

located in the right lower lobe measuring 7 mm (series 4, image 28).  

Approximately 4 tiny right middle lobe nodules are noted as well.  Two left 

lower lobe nodules are demonstrated (the largest measures 6 mm in diameter 

(series 4, image 45).

CARDIAC:The heart size is borderline.  A moderate-sized pericardial effusion 

is noted.

THORACIC AORTA:Moderate atherosclerotic disease is noted.  No aneurysm is 

seen.

MEDIASTINUM/SOFI:Normal.

PLEURA:Normal.

CHEST WALL:Normal.

LIMITED ABDOMEN:A 2 cm hiatal hernia is noted.

BONES:Mild thoracic spondylosis is noted.  No compression fractures seen.

THYROID:Normal.

OTHER:No additional findings. 

 

CONCLUSION:

1.  No acute infiltrate or pleural effusion is seen.

2.  Subcentimeter bilateral pulmonary nodules are noted as described above.  

The nodules are unchanged since 01/21/2019.  I do not feel that additional 

follow-up is necessary.

3.  Moderate-sized pericardial effusion is noted.

 

 

 

 

 

Dictated by: Mark Quick M.D. on 05/17/2020 at 11:49 PM     

Electronically Signed By: Mark Quick M.D. on 05/17/2020 at 11:58 PM

## 2020-05-17 NOTE — DIREP
PROCEDURE:CHEST 1 VIEW

 

COMPARISON:[facility], CR, XRAY CHEST 2 VWS, 12/18/2019, 09:35 

AM.

 

INDICATIONS:Chest pain

 

FINDINGS:

LUNGS/PLEURA:No significant pulmonary parenchymal abnormalities. No effusions.

VASCULATURE:Normal.  Unremarkable pulmonary vasculature.

CARDIAC:Normal.  No cardiac silhouette abnormality or cardiomegaly. 

MEDIASTINUM:Calcified aortic arch.

BONES:Osteophytes lower thoracic spine.

OTHER:Monitor leads are in place.

 

CONCLUSION:No acute cardiopulmonary abnormalities.  No change from previous 

study.

 

 

 

Dictated by: Ugo Lopez M.D. on 05/17/2020 at 06:30 PM     

Electronically Signed By: Ugo Lopez M.D. on 05/17/2020 at 06:31 PM

## 2020-05-17 NOTE — NUR
REPORT



CALLED AND GIVEN TO BERNARD QUINTERO AT THIS TIME.  PT TAKEN VIA WHEELCHAIR 
BY KEYANNA SAMLERON SUP TO [facility].

## 2020-05-18 VITALS — SYSTOLIC BLOOD PRESSURE: 151 MMHG | DIASTOLIC BLOOD PRESSURE: 76 MMHG

## 2020-05-18 VITALS — DIASTOLIC BLOOD PRESSURE: 65 MMHG | SYSTOLIC BLOOD PRESSURE: 138 MMHG

## 2020-05-18 VITALS — DIASTOLIC BLOOD PRESSURE: 62 MMHG | SYSTOLIC BLOOD PRESSURE: 122 MMHG

## 2020-05-18 VITALS — DIASTOLIC BLOOD PRESSURE: 71 MMHG | SYSTOLIC BLOOD PRESSURE: 129 MMHG

## 2020-05-18 NOTE — NUR
UPDATE

NOTIFIED DR HANSON REGARDING PATIENT IS LEAVING AMA. SHE STATED SHE WOULD CALL HIS OFFICE 
[phone] FOR A FOLLOW UP APPOINTMENT WITH HIM. TYLER HERNANDEZ TOOK HER DOWNSTAIRS AND WATCHED HER 
GET IN VEHICLE THAT CAME TO PICK HER UP.

## 2020-05-18 NOTE — NUR
DISCHARGE PLANNING:

MORE VISITED WITH PT REGARDING DISCHARGE PLANNING. PT LIVES HOME ALONE. PT IS INDEPENDENT ON 
ALD'S, BUT DOES HAVE A CANE AND 2 WALKERS TO USE IF SHE NEEDED TO. PT'S PCP IS KRISTAL JO NP. PT STILL WORKS DAILY AT [facility]. PT DENIES NEEDING ADDITIONAL RESOURCES 
AT THIS TIME. PT'S PLAN IS TO RETURN HOME TO ROUTINE SELF CARE. NO FURTHER NEEDS NOTED OR 
IDENTIFIED AT THIS TIME. CM/SS TO CONTINUE TO FOLLOW PT'S POC. 

-------------------------------------------------------------------------------

Addendum: 05/19/20 at 1141 by Natalya AGUERO

-------------------------------------------------------------------------------

MORE FAXED OVER PT'S CLINICAL TO LUKAS BUCHANAN PT'S PCP FOR CONTINUATION OF CARE.

## 2020-05-18 NOTE — NUR
pT STATES THAT SHE IS LEAVING, SHE DOES NOT WISH TO WAIT FOR DR PALOMINO.  TELEMETRY AND IV 
REMOVED.  PT LEFT VIA W/C WAS PICKED UP BY FRIEND AT ER ENTRANCE

## 2020-05-18 NOTE — CNH
DATE OF CONSULTATION:  05/18/2020



REASON FOR CONSULTATION:  Chest pain.



HISTORY OF PRESENT ILLNESS:  This is an 80-year-old female who presented to the

Emergency Department with symptoms of left-sided chest pain, which has been

going on intermittently for the last few weeks.  She states that her symptoms

progressively got worse with radiation to the back and prompted her to report to

the Emergency Department.  On presentation, EKG shows normal sinus rhythm with

nonspecific ST-T wave changes.  Troponin is negative x3.  CT of the chest done

revealed moderate pericardial effusion, and so Cardiology consultation was

placed.  She denies any shortness of breath or palpitations.



PAST MEDICAL HISTORY:  Significant for type 2 diabetes mellitus, COPD, on home

oxygen, hypertension and gout.



PAST SURGICAL HISTORY:

1.  Left hip surgery.

2.  Tonsillectomy.

3.  Appendectomy.

4.  Cardiac catheterization done few years back with no intervention needed as

reported per patient.

5.  Sciatic surgery.



ALLERGIES:  She has no known drug allergies.



MEDICATIONS:  She takes losartan 25 mg p.o. daily, metformin 500 p.o. daily,

Lipitor 10 mg p.o. at bedtime, allopurinol 100 mg p.o. daily, Norvasc 5 mg p.o.

daily, hydrochlorothiazide 25 mg p.o. daily and albuterol inhaler.



SOCIAL HISTORY:  She denies alcohol use.  She denies tobacco use.  She denies

illicit drug use.



FAMILY HISTORY:  She denies any family history of premature coronary artery

disease or sudden cardiac death.



REVIEW OF SYSTEMS:  As per HPI and as per ER notes, all systems reviewed and

negative for interval change.



PHYSICAL EXAMINATION:

VITAL SIGNS:  Blood pressure is 138/65, respiratory rate is 16, heart rate is

93, temperature is 98.1, oxygen saturation is 92% on room air.

GENERAL:  She is in no apparent distress, alert and oriented x3.

HEENT:  Normocephalic, atraumatic.  Extraocular muscles intact.  Pupils are

equally reactive to light and accommodation.

HEART:  S1, S2.  No gallops, murmurs, rubs, or clicks.

LUNGS:  Clear to auscultation bilaterally.  No wheezing, rhonchi or rales.

ABDOMEN:  Soft, nontender, nondistended.  Positive bowel sounds in all 4

quadrants.

EXTREMITIES:  No cyanosis, no clubbing, no edema.  +3 pedal pulses palpable

bilaterally.

NEUROLOGIC:  No neurological deficits.  Sensation is intact.



IMPRESSION:

1.  Chest pain, rule out acute coronary syndrome.

2.  Moderate pericardial effusion as seen on chest CT.

3.  Hypertension.

4.  Type 2 diabetes mellitus.

5.  Chronic obstructive pulmonary disease, on home oxygen.

6.  Gout.



RECOMMENDATIONS:  This is an 80-year-old female who presented to [facility] with symptoms of chest discomfort.  She was noted to have

moderate pericardial effusion on chest CT.  I would obtain a 2D echo to evaluate

left ventricular ejection fraction, as well as to rule out pericardial effusion.

 A cardiac stress test will also be obtained to rule out myocardial ischemia. 

In the meantime, we will continue on home cardiac medications as well as keep

her on telemetry.  Further recommendations will be made based on her overall

clinical course as well as the results from cardiac testing.





______________________________

SANDRO HANSON D.O. DR:  SANDIP/mami  JOB# [id number]  [id number]

DD:  05/18/2020 13:43  DT:  05/18/2020 15:41

## 2020-05-18 NOTE — PCM.EKG
[facility]

                                       

Test Date:    2020               Test Time:    07:20:38

Pat Name:     SUSHANT CRAIG               Department:   

Patient ID:   [id number]          Room:         340 A

Gender:       F                        Technician:   RADHA

:          1940               Requested By: NAN ERAZO

Order Number: [id number]           Reading MD:   Nan Erazo

                                 Measurements

Intervals                              Axis          

Rate:         84                       P:            82

IA:           191                      QRS:          77

QRSD:         89                       T:            63

QT:           373                                    

QTc:          441                                    

                           Interpretive Statements

Sinus rhythm

Borderline low voltage, extremity leads

Abnormal R-wave progression, late transition

Compared to ECG 2018 20:18:48

No significant changes



Electronically Signed On 2020 8:41:16 CDT by Nan Erazo



Please click the below link to view image of tracing.

## 2020-05-18 NOTE — PRM.PN
Subjective


Subjective


Date:  May 18, 2020


Time:  09:15


Subjective


Pt denies any pain now


Patient History:  


Alzheimer's disease


  32 MOTHER


FH: cancer


  G8 SISTER





VTE


VTE Risk Total Score:  3


VTE Risk Score


VTE Risk:


Score 0-1 = Low Risk


(Aggressive mobilization; early ambulation; no VTE prophylaxis required)


Score 2: Moderate Risk


(Intermittent/Pneumatic Compression Device OR Lovenox/Heparin/Coumadin)


Score 3-4: High Risk


(Intermittent/Pneumatic Compression Device AND Lovenox/Heparin/Coumadin)


Score  > or =5: Highest Risk


(Intermittent/Pneumatic Compression Device AND Lovenox/Heparin/Coumadin)


Antico:Hep/LMWH/Coum/Xarelto:  No


Mechanical device ordered:  No





Review of Systems


Constitutional:  No: Fever, Chills, Sweats, Weakness


Eyes:  No: Pain, Vision change, Conjunctivae inflammation


ENT:  No: Ear pain, Ear discharge, Nose pain, Nose discharge


Respiratory:  No: Cough, Dry, Shortness of breath, SOB with excertion


Cardiovascular:  No: Chest Pain, Palpitations, Orthopnea


Gastrointestinal:  No: Nausea, Vomiting, Abdominal Pain


Genitourinary:  No Dysuria, No Frequency, No Incontinence


Musculoskeletal:  No: neck pain, shoulder pain, arm pain


Skin:  No: Lesions, Jaundice, Bruising


Neurological:  No: Confusion, Seizures


Allergies:  


Coded Allergies:  


     No Known Allergies (Unverified , 1/3/14)


Scheduled


Albuterol Sulfate (Albuterol Sulfate), 2 MG PO PRN, (Reported)


Allopurinol (Allopurinol), 1 TAB PO DAILY, (Reported)


Amlodipine Besylate (Amlodipine Besylate), 1 TAB PO DAILY, (Reported)


Atorvastatin 10MG (Lipitor 10MG), 1 TAB PO HS, (Reported)


Hydrochlorothiazide (Hydrochlorothiazide), 1 TAB PO DAILY, (Reported)


Losartan Potassium (Losartan Potassium), 1 TAB PO DAILY, (Reported)


Metformin Hcl (Metformin Hcl), 500 MG PO DAILY24, (Reported)





Objective


Vitals and I/O





Vital Sign - Last 24 Hours








 5/17/20 5/17/20 5/17/20 5/17/20





 17:53 17:53 18:00 18:46


 


Temp 97.9 97.9 97.9 


 


Pulse 83 83 83 84


 


Resp 16 16 16 16


 


B/P (MAP)   131/68 (89) 140/68 (92)


 


Pulse Ox 94  94 91


 


O2 Delivery   Room Air Room Air


 


    





 5/17/20 5/17/20 5/17/20 5/18/20





 19:31 21:50 23:25 02:02


 


Temp   97.8 


 


Pulse 79  75 


 


Resp 17  18 


 


B/P (MAP) 152/68 (96)  130/65 (86) 


 


Pulse Ox 93  93 


 


O2 Delivery Room Air Room Air Room Air Room Air





   Nasal Canula 


 


O2 Flow Rate   1.00 


 


    





 5/18/20 5/18/20 5/18/20 5/18/20





 04:17 08:04 08:55 09:02


 


Temp 97.9   


 


Pulse 74  98 98


 


Resp 18 18 18 18


 


B/P (MAP) 122/62 (82)   


 


Pulse Ox 92 95 88 88


 


O2 Delivery Nasal Canula  Room Air 


 


O2 Flow Rate 1.00   


 


FiO2   21 


 


    





 5/18/20   





 09:07   


 


Pulse 96   


 


Resp 18   


 


Pulse Ox 94   














Intake and Output 


 


 5/18/20





 07:00


 


Intake Total 442 ml


 


Output Total 1450 ml


 


Balance -1008 ml








General:  Alert, Oriented X3, Cooperative, No acute distress


HEENT:  Atraumatic, PERRLA, EOMI


Neck:  Supple, No JVD


Lungs:  Clear to auscultation


Heart:  Regular rate, Normal S1, Normal S2


Abdomen:  Normal bowel sounds, Soft


Extremities:  No clubbing, No cyanosis


Skin:  No rashes, No breakdown


Neuro:  Normal speech


Psych/Mental Status:  Mental status NL, Mood NL


All Results(Lab/Rad)





Laboratory Tests








Test


 5/17/20


17:45 5/18/20


00:15 5/18/20


05:54 5/18/20


05:55


 


White Blood Count 9.0 10^3/uL    


 


Red Blood Count 4.92 10^6/uL    


 


Hemoglobin 14.0 g/dL    


 


Hematocrit 43.0 %    


 


Mean Corpuscular Volume 87.4 fL    


 


Mean Corpuscular Hemoglobin 28.5 pg    


 


Mean Corpuscular Hemoglobin


Concent 32.6 g/dL 


 


 


 





 


Red Cell Distribution Width 12.9 %    


 


Platelet Count 263 10^3/uL    


 


Mean Platelet Volume 9.5 fL    


 


Neutrophils (%) (Auto) 65.4 %    


 


Lymphocytes (%) (Auto) 23.8 %    


 


Monocytes (%) (Auto) 7.9 %    


 


Neutrophils # (Auto) 5.9 10^3/uL    


 


Lymphocytes # (Auto) 2.14 10^3/uL1    


 


Monocytes # (Auto) 0.7 10^3/uL    


 


Absolute Immature Granulocyte


(auto 0 10^3 u/L 


 


 


 





 


Absolute Eosinophils (auto) 0.2 10^3/uL    


 


Immature Granulocytes % 0.00 %    


 


Eosinophils % 2.3 %    


 


Basophils % 0.6 %    


 


Basophils # 0.1 10^3/uL    


 


Prothrombin Time 9.5 SEC    


 


Prothrombin Time INR


(Non-Therap) 0.9 


 


 


 





 


Activated Partial


Thromboplast Time 25.4 SEC 


 


 


 





 


D-Dimer 0.70 mg/L    


 


Sodium Level 140 mmol/L    


 


Potassium Level 3.9 mmol/L    


 


Chloride Level 102.0 mmol/L    


 


Carbon Dioxide Level 31.6 mmol/L    


 


Anion Gap 10.3    


 


Blood Urea Nitrogen 17 mg/dL    


 


Creatinine 0.70 mg/dL    


 


Estimated GFR (


American) 97.4 


 


 


 





 


Est GFR (CKD-EPI)(Non-Afr


American) 80.5 


 


 


 





 


BUN/Creatinine Ratio 24.0    


 


Glucose Level 107 mg/dL    


 


Calcium Level 9.4 mg/dL    


 


Total Bilirubin 0.2 mg/dL    


 


Aspartate Amino Transf


(AST/SGOT) 21 U/L 


 


 


 





 


Alanine Aminotransferase


(ALT/SGPT) 20 U/L 


 


 


 





 


Alkaline Phosphatase 49 U/L    


 


Total Creatine Kinase 60 U/L    


 


Creatine Kinase MB 1.2 ng/mL    


 


Troponin I < 0.02 ng/mL  < 0.02 ng/mL   < 0.02 ng/mL 


 


Pro-B-Type Natriuretic Peptide 193 pg/mL    


 


Total Protein 7.3 g/dL    


 


Albumin 3.7 g/dL    


 


Globulin 3.6    


 


Bedside Glucose   96  


 


Thyroid Stimulating Hormone


(TSH) 


 


 


 2.603 mIU/mL 





 


Free Thyroxine    1.17 ng/dL 








Current Medications








 Medications


  (Trade)  Dose


 Ordered  Sig/Nelly


 Route


 PRN Reason  Start Time


 Stop Time Status Last Admin


Dose Admin


 


 Enoxaparin Sodium


  (Lovenox)  60 mg  STAT  STAT


 SQ


   5/17/20 18:43


 5/17/20 18:49 DC 5/17/20 18:56





 


 Enoxaparin Sodium


  (Lovenox)  60 mg  STK-MED ONCE


 SQ


   5/17/20 18:52


 5/17/20 18:54 DC  





 


 Allopurinol


  (Zyloprim)  100 mg  DAILY


 PO


   5/18/20 09:00


 6/17/20 08:59   





 


 Losartan Potassium


  (Cozaar)  25 mg  DAILY


 PO


   5/18/20 09:00


 6/17/20 08:59   





 


 Albuterol Sulfate


  (Ventolin)  2.5 mg  STK-MED ONCE


 


   5/18/20 08:58


 5/18/20 09:00 DC  





 


 Albuterol Sulfate


  (Ventolin)  2.5 mg  DAILY


 IH


   5/18/20 09:00


 6/17/20 08:59 UNV 5/18/20 09:04














Course


Sepsis Screening Results: Posi:  


NEGATIVE


Sepsis Qualifier/Stage:  NO DEFINITE RISK


Duration or Total Time Spent w:  60 min


Vitals & review Data





Vital Sign - Last 24 Hours








 5/17/20 5/17/20 5/17/20 5/17/20





 17:53 17:53 18:00 18:46


 


Temp 97.9 97.9 97.9 


 


Pulse 83 83 83 84


 


Resp 16 16 16 16


 


B/P (MAP)   131/68 (89) 140/68 (92)


 


Pulse Ox 94  94 91


 


O2 Delivery   Room Air Room Air


 


    





 5/17/20 5/17/20 5/17/20 5/18/20





 19:31 21:50 23:25 02:02


 


Temp   97.8 


 


Pulse 79  75 


 


Resp 17  18 


 


B/P (MAP) 152/68 (96)  130/65 (86) 


 


Pulse Ox 93  93 


 


O2 Delivery Room Air Room Air Room Air Room Air





   Nasal Canula 


 


O2 Flow Rate   1.00 


 


    





 5/18/20 5/18/20 5/18/20 5/18/20





 04:17 08:04 08:55 09:02


 


Temp 97.9   


 


Pulse 74  98 98


 


Resp 18 18 18 18


 


B/P (MAP) 122/62 (82)   


 


Pulse Ox 92 95 88 88


 


O2 Delivery Nasal Canula  Room Air 


 


O2 Flow Rate 1.00   


 


FiO2   21 


 


    





 5/18/20   





 09:07   


 


Pulse 96   


 


Resp 18   


 


Pulse Ox 94   














Intake and Output 


 


 5/18/20





 07:00


 


Intake Total 442 ml


 


Output Total 1450 ml


 


Balance -1008 ml








Laboratory Tests








Test


 5/17/20


17:45 5/18/20


00:15 5/18/20


05:54 5/18/20


05:55


 


White Blood Count 9.0 10^3/uL    


 


Red Blood Count 4.92 10^6/uL    


 


Hemoglobin 14.0 g/dL    


 


Hematocrit 43.0 %    


 


Mean Corpuscular Volume 87.4 fL    


 


Mean Corpuscular Hemoglobin 28.5 pg    


 


Mean Corpuscular Hemoglobin


Concent 32.6 g/dL 


 


 


 





 


Red Cell Distribution Width 12.9 %    


 


Platelet Count 263 10^3/uL    


 


Mean Platelet Volume 9.5 fL    


 


Neutrophils (%) (Auto) 65.4 %    


 


Lymphocytes (%) (Auto) 23.8 %    


 


Monocytes (%) (Auto) 7.9 %    


 


Neutrophils # (Auto) 5.9 10^3/uL    


 


Lymphocytes # (Auto) 2.14 10^3/uL1    


 


Monocytes # (Auto) 0.7 10^3/uL    


 


Absolute Immature Granulocyte


(auto 0 10^3 u/L 


 


 


 





 


Absolute Eosinophils (auto) 0.2 10^3/uL    


 


Immature Granulocytes % 0.00 %    


 


Eosinophils % 2.3 %    


 


Basophils % 0.6 %    


 


Basophils # 0.1 10^3/uL    


 


Prothrombin Time 9.5 SEC    


 


Prothrombin Time INR


(Non-Therap) 0.9 


 


 


 





 


Activated Partial


Thromboplast Time 25.4 SEC 


 


 


 





 


D-Dimer 0.70 mg/L    


 


Sodium Level 140 mmol/L    


 


Potassium Level 3.9 mmol/L    


 


Chloride Level 102.0 mmol/L    


 


Carbon Dioxide Level 31.6 mmol/L    


 


Anion Gap 10.3    


 


Blood Urea Nitrogen 17 mg/dL    


 


Creatinine 0.70 mg/dL    


 


Estimated GFR (


American) 97.4 


 


 


 





 


Est GFR (CKD-EPI)(Non-Afr


American) 80.5 


 


 


 





 


BUN/Creatinine Ratio 24.0    


 


Glucose Level 107 mg/dL    


 


Calcium Level 9.4 mg/dL    


 


Total Bilirubin 0.2 mg/dL    


 


Aspartate Amino Transf


(AST/SGOT) 21 U/L 


 


 


 





 


Alanine Aminotransferase


(ALT/SGPT) 20 U/L 


 


 


 





 


Alkaline Phosphatase 49 U/L    


 


Total Creatine Kinase 60 U/L    


 


Creatine Kinase MB 1.2 ng/mL    


 


Troponin I < 0.02 ng/mL  < 0.02 ng/mL   < 0.02 ng/mL 


 


Pro-B-Type Natriuretic Peptide 193 pg/mL    


 


Total Protein 7.3 g/dL    


 


Albumin 3.7 g/dL    


 


Globulin 3.6    


 


Bedside Glucose   96  


 


Thyroid Stimulating Hormone


(TSH) 


 


 


 2.603 mIU/mL 





 


Free Thyroxine    1.17 ng/dL 








                               Current Medications








 Medications


  (Trade)  Dose


 Ordered  Sig/Nelly


 PRN Reason  Start Time


 Stop Time Status Last Admin


 


 Albuterol Sulfate


  (Ventolin)  2.5 mg  DAILY


   5/18/20 09:00


 6/17/20 08:59 UNV 5/18/20 09:04





 


 Allopurinol


  (Zyloprim)  100 mg  DAILY


   5/18/20 09:00


 6/17/20 08:59   





 


 Losartan Potassium


  (Cozaar)  25 mg  DAILY


   5/18/20 09:00


 6/17/20 08:59   











LEVEL 1 SEPSIS INFECTION CRITE:  None/Not assessed


LEVEL 2-SIRS (LIST ALL THAT AP:  None/Not assessed


Hematologic Evidence:  None/Not assessed


Hepatic Evidence:  None/Not assessed


Neurological Evidence:  None/Not assessed


Renal Evidence:  None/Not assessed


O2 Sat by Pulse Oximetry:  94


Oxygen Flow Rate:  1.00





Assessment/Plan


81 yo female with CP. HTN, DM, pericadial effusion





- cardiology consulted


- ECHO pending


- follow clinically











DORIAN SANTOS MD              May 18, 2020 09:26

## 2020-05-18 NOTE — PRM.DC
DISCHARGE SUMMARY


DISCHARGE SUMMARY


DATE OF ADMISSION: May 17, 2020





DATE OF DISCHARGE: May 18, 2020





ADMITTING DIAGNOSES: Chest pain, type 2 diabetes mellitus, hypertension





DISCHARGE DIAGNOSES: Chest pain resolved, diabetes with hypertension





DISCHARGE DISPOSITION: Patient signed out AMA to go home





DISCHARGE CONDITION: Stable





HOSPITAL COURSE: 80-year-old female who came in with substernal chest pain that 

was atypical in nature and has been going on for a few weeks.  Due to her 

cardiac risk factors I put her in and obtain serial cardiac enzymes which all 

have been negative and EKG showed no change.  Cardiology was consulted and a 

stress test was done that was essentially negative.  I did a CT of the chest 

that showed a pericardial effusion and this was confirmed  by echo to be a small

effusion without any hemodynamic compromise.  I do believe her chest pain may be

pleuritic in nature due to the fact that she had pneumonia many years ago and 

was complicated with a chest tube to the left side.  She went ahead and signed 

out AMA this evening before I got back to talk to her and cardiology was on 

their way to talk to her as well to.





DIET: Resume home diet





ACTIVITY: As tolerated





MEDICATIONS: Resume home medications





FOLLOW-UP: 1.  She can call cardiology to make a follow-up appointment to go 

over results and to go from there


      2.  Follow-up with her primary care provider, MARY Dorsey in 1 to 2 

weeks











DORIAN SANTOS MD              May 18, 2020 20:25

## 2020-05-18 NOTE — STRESS
DATE OF SERVICE:  05/17/2020



INDICATION FOR PROCEDURE:  Chest pain.



Baseline EKG shows normal sinus rhythm, poor R-wave progression, likely normal

variant.  Stress EKG shows sinus tachycardia, unchanged from baseline.  At

recovery, EKG shows normal sinus rhythm, unchanged from baseline.  Baseline

blood pressure is noted to be 159/71 and remained the same during stress.  At

recovery, the blood pressure was 100/43.  Baseline heart rate was noted to be 93

beats per minute at rest.  During stress, the heart rate pati to 107 beats per

minute.  At recovery, the heart rate was noted to be 81 beats per minute.  Blood

pressure and heart rate were appropriate for stress.  There were no significant

symptoms noted during stress.  There were no arrhythmias noted during stress. 

EKG portion of stress test is negative for myocardial ischemia.  Nuclear images

reveal homogeneous tracer distribution across all wall segments in both rest and

stress images.  There is no evidence of myocardial ischemia or infarction.  TID

is noted to be 1.46.  There is no evidence of diaphragmatic attenuation

artifact.  Left ventricular ejection fraction is 76%.  EDV is 14 mL, ESV is 3

mL.  The left ventricle is normal in size.  Gated motion images shows normal

wall motion across all segments of the left ventricle.



IMPRESSION:  Normal myocardial perfusion imaging with no evidence of myocardial

ischemia or infarction.  LVEF of 76%.  This is a negative study.





______________________________

SANDRO HANSON D.O. DR:  SANDIP/mami  JOB# [id number]  [id number]

DD:  05/18/2020 18:48  DT:  05/18/2020 19:41

## 2020-05-18 NOTE — NUR
INFORMED PT ABOUT INSURANCE POSSIBLY NOT PAYING, AND THAT SHE DID NOT KNOW THE RESULTS OF 
ALL OF ALL THE DIAGNOSTIC TEST, PT STATES,"I DON'T CARE, IM GOING HOME, I'LL CALL DR PALOMINO'S OFFICE IN THE MORNING, IF HE WILL NOT SEE ME, I'LL GOT TO A DOCTOR ON [address]"

## 2020-05-29 ENCOUNTER — HOSPITAL ENCOUNTER (OUTPATIENT)
Dept: HOSPITAL 65 - RAD | Age: 80
Discharge: HOME | End: 2020-05-29
Attending: NURSE PRACTITIONER
Payer: MEDICARE

## 2020-05-29 DIAGNOSIS — R93.89: ICD-10-CM

## 2020-05-29 DIAGNOSIS — D25.9: Primary | ICD-10-CM

## 2020-05-29 PROCEDURE — 76830 TRANSVAGINAL US NON-OB: CPT

## 2020-05-29 PROCEDURE — 76856 US EXAM PELVIC COMPLETE: CPT

## 2020-05-29 NOTE — DIREP
PROCEDURE:US PELVIS COMPLETE

 

COMPARISON:[facility], CT, CT CHEST W/O, 05/17/2020, 10:59 PM.  

[facility], CT, CT ABD/PELVIS W/ CONTRAST, 11/06/2017, 02:18 PM.

 

INDICATIONS:D25.9 LEIOMYOMA OF UTERUS

 

TECHNIQUE:Pelvic ultrasound using transabdominal technique.  Endovaginal 

images were also obtained for better assessment of the uterus and adnexa.

 

FINDINGS:

LMP: Menopausal

UTERUS:Size is 7.5 x 4.0 x 6.4 cm.  The myometrium is heterogenous. Multiple 

heterogeneous, solid appearing lesions are identified in the cervix measuring 

1.8 x 1.3 x 1.7 cm, in the mid aspect of the myometrium measuring 3.5 x 3.2 x 

4.0 cm, and in the fundus measuring 2.2 x 1.6 x 1.9 cm.

 

ENDOMETRIUM:Thickened measures 1.3 cm. The endometrium appears heterogeneous 

in echotexture with ill-defined borders.

 

RIGHT OVARY:Normal appearance.  1.2 x 0.6 x 1.4 cm.  

LEFT OVARY:Normal appearance.  1.2 x 0.7 x 1.4 cm.  

 

CUL-DE-SAC:Normal.

OTHER:Negative.

 

CONCLUSION:1.  Markedly thickened endometrium for age, with small amount of 

endometrial fluid.  Direct visualization and possible biopsy should be 

considered.

2.  Multiple uterine fibroids, measuring up to 3.5 cm. 

 

 

 

Dictated by: LEATHA Physician on 05/29/2020 at 02:24 PM     

Electronically Signed By: KAY Ulrich M.D. on 05/29/2020 at 02:39 PM   

   

 

 

ac

## 2020-08-16 ENCOUNTER — HOSPITAL ENCOUNTER (EMERGENCY)
Dept: HOSPITAL 65 - ER | Age: 80
Discharge: HOME | End: 2020-08-16
Payer: MEDICARE

## 2020-08-16 VITALS — DIASTOLIC BLOOD PRESSURE: 57 MMHG | SYSTOLIC BLOOD PRESSURE: 125 MMHG

## 2020-08-16 VITALS
WEIGHT: 112 LBS | SYSTOLIC BLOOD PRESSURE: 124 MMHG | DIASTOLIC BLOOD PRESSURE: 47 MMHG | BODY MASS INDEX: 21.99 KG/M2 | HEIGHT: 60 IN

## 2020-08-16 VITALS — SYSTOLIC BLOOD PRESSURE: 120 MMHG | DIASTOLIC BLOOD PRESSURE: 65 MMHG

## 2020-08-16 DIAGNOSIS — E78.00: ICD-10-CM

## 2020-08-16 DIAGNOSIS — I10: ICD-10-CM

## 2020-08-16 DIAGNOSIS — J44.9: Primary | ICD-10-CM

## 2020-08-16 DIAGNOSIS — K21.9: ICD-10-CM

## 2020-08-16 DIAGNOSIS — E11.9: ICD-10-CM

## 2020-08-16 DIAGNOSIS — R79.1: ICD-10-CM

## 2020-08-16 DIAGNOSIS — Z79.899: ICD-10-CM

## 2020-08-16 LAB
ALP INTEST CFR SERPL: 45 U/L (ref 50–136)
ALT SERPL-CCNC: 23 U/L (ref 12–78)
APPEARANCE UR: CLEAR
AST SERPL-CCNC: 28 U/L (ref 0–35)
BASOPHILS # BLD AUTO: 0 10^3/UL (ref 0–0.1)
BASOPHILS NFR BLD AUTO: 0.2 % (ref 0–0.2)
BILIRUB UR STRIP.AUTO-MCNC: NEGATIVE MG/DL
CALCIUM SERPL-MCNC: 8.9 MG/DL (ref 8.4–10.5)
CO2 BLDA-SCNC: 29.1 MMOL/L (ref 20–32)
COLOR UR: YELLOW
EOSINOPHIL # BLD AUTO: 0 10^3/UL (ref 0–0.2)
EOSINOPHIL NFR BLD AUTO: 0.9 % (ref 0–5)
ERYTHROCYTE [DISTWIDTH] IN BLOOD BY AUTOMATED COUNT: 13.3 % (ref 11.5–14.5)
GLUCOSE PRE 100 G GLC PO SERPL-MCNC: 90 MG/DL (ref 70–110)
LYMPHOCYTES # BLD AUTO: 0.94 10^3/UL1 (ref 1–4.8)
LYMPHOCYTES NFR BLD AUTO: 20.7 % (ref 24–44)
MCH RBC QN AUTO: 28.6 PG (ref 26–34)
MONOCYTES # BLD AUTO: 0.5 10^3/UL (ref 0.3–0.8)
MONOCYTES NFR BLD AUTO: 11.5 % (ref 5–12)
NEUTROPHILS # BLD AUTO: 3 10^3/UL (ref 1.8–7.7)
NEUTROPHILS NFR BLD AUTO: 66.3 % (ref 41–85)
PLATELET # BLD AUTO: 212 10^3/UL (ref 150–400)
UROBILINOGEN UR QL STRIP.AUTO: NORMAL

## 2020-08-16 PROCEDURE — 85730 THROMBOPLASTIN TIME PARTIAL: CPT

## 2020-08-16 PROCEDURE — 99285 EMERGENCY DEPT VISIT HI MDM: CPT

## 2020-08-16 PROCEDURE — 87804 INFLUENZA ASSAY W/OPTIC: CPT

## 2020-08-16 PROCEDURE — 86677 HELICOBACTER PYLORI ANTIBODY: CPT

## 2020-08-16 PROCEDURE — 93005 ELECTROCARDIOGRAM TRACING: CPT

## 2020-08-16 PROCEDURE — 85610 PROTHROMBIN TIME: CPT

## 2020-08-16 PROCEDURE — 83880 ASSAY OF NATRIURETIC PEPTIDE: CPT

## 2020-08-16 PROCEDURE — 87635 SARS-COV-2 COVID-19 AMP PRB: CPT

## 2020-08-16 PROCEDURE — 85379 FIBRIN DEGRADATION QUANT: CPT

## 2020-08-16 PROCEDURE — 71045 X-RAY EXAM CHEST 1 VIEW: CPT

## 2020-08-16 PROCEDURE — 81000 URINALYSIS NONAUTO W/SCOPE: CPT

## 2020-08-16 PROCEDURE — 84484 ASSAY OF TROPONIN QUANT: CPT

## 2020-08-16 PROCEDURE — 87070 CULTURE OTHR SPECIMN AEROBIC: CPT

## 2020-08-16 PROCEDURE — 80053 COMPREHEN METABOLIC PANEL: CPT

## 2020-08-16 PROCEDURE — 87040 BLOOD CULTURE FOR BACTERIA: CPT

## 2020-08-16 PROCEDURE — 82550 ASSAY OF CK (CPK): CPT

## 2020-08-16 PROCEDURE — 87880 STREP A ASSAY W/OPTIC: CPT

## 2020-08-16 PROCEDURE — 82553 CREATINE MB FRACTION: CPT

## 2020-08-16 PROCEDURE — 36415 COLL VENOUS BLD VENIPUNCTURE: CPT

## 2020-08-16 PROCEDURE — 83605 ASSAY OF LACTIC ACID: CPT

## 2020-08-16 PROCEDURE — 85025 COMPLETE CBC W/AUTO DIFF WBC: CPT

## 2020-08-16 NOTE — DIREP
PROCEDURE:CHEST 1 VIEW

 

COMPARISON:[facility], JAMEE, XRAY CHEST SINGLE VW, 05/17/2020, 

05:53 PM.  [facility], JAMEE, XRAY CHEST 2 VWS, 12/18/2019, 09:35 AM. 

 [facility], CR, XRAY CHEST 2 VWS, 12/02/2019, 09:42 AM.

 

INDICATIONS:pneumonia

 

FINDINGS:

LUNGS/PLEURA:No significant pulmonary parenchymal abnormalities. No effusions.

VASCULATURE:Normal.  Unremarkable pulmonary vasculature.

CARDIAC:Normal.  No cardiac silhouette abnormality or cardiomegaly. 

MEDIASTINUM:Calcified aorta.

BONES:Proximal left humeral fracture.

OTHER:Negative.  

 

CONCLUSION:1.  No acute pulmonary process.

2.  Age indeterminate proximal left humeral fracture.

 

 

 

Dictated by: KAY Ulrich M.D. on 08/16/2020 at 06:21 PM     

Electronically Signed By: KAY Ulrich M.D. on 08/16/2020 at 06:27 PM

## 2020-08-16 NOTE — PCM.EKG
[facility]

                                       

Test Date:    2020               Test Time:    18:13:54

Pat Name:     SUSHANT CRAIG               Department:   

Patient ID:   [id number]          Room:          

Gender:       F                        Technician:   AZ

:          1940               Requested By: RIO LADD

Order Number: [id number]           Reading MD:     

                                 Measurements

Intervals                              Axis          

Rate:         96                       P:            76

MT:           177                      QRS:          97

QRSD:         82                       T:            62

QT:           351                                    

QTc:          444                                    

                           Interpretive Statements

Sinus rhythm

Right axis deviation

Low voltage, precordial leads

Consider anterior infarct

Compared to ECG 2020 07:20:38

Right-axis deviation now present

Myocardial infarct finding now present



Please click the below link to view image of tracing.

## 2020-08-16 NOTE — ER.PDOC
General


Chief Complaint:  General Complaint


Stated Complaint:  WEAKNESS, COUGH, FEVER


TRAVEL OUT OF US:  No


Time seen by MD:  18:33


Source:  patient


Exam Limitations:  no limitations





History of Present Illness


Initial Comments


COVID +V 3 WEEKS AGO


Timing/Duration:  24 hours


Severity:  mild


Modifying Factors:  improves with rest


Allergies:  


Coded Allergies:  


     No Known Allergies (Unverified , 1/3/14)


Home Meds


Reported Medications


Losartan Potassium (LOSARTAN POTASSIUM) 25 Mg Tablet, 1 TAB PO DAILY, #90 TAB 1 

Refill


   5/17/20


Metformin Hcl (METFORMIN HCL) 500 Mg Tablet, 500 MG PO DAILY24, TAB


   5/17/20


Atorvastatin 10MG (LIPITOR 10MG) 10 Mg Tablet, 1 TAB PO HS, #90 TAB 1 Refill


   5/17/20


Allopurinol (ALLOPURINOL) 100 Mg Tablet, 1 TAB PO DAILY, #30 TAB 5 Refills


   5/17/20


Amlodipine Besylate (AMLODIPINE BESYLATE) 5 Mg Tablet, 1 TAB PO DAILY, #30 TAB 5

Refills


   5/17/20


Hydrochlorothiazide (HYDROCHLOROTHIAZIDE) 25 Mg Tablet, 1 TAB PO DAILY, #30 TAB 

5 Refills


   5/17/20


Albuterol Sulfate (ALBUTEROL SULFATE) 2 Mg/5 Ml Syrup, 2 MG PO PRN, ML


   5/17/20





Past Medical History


Medical History:  COPD, diabetes, GERD, high cholesterol, hypertension


Surgical History:  appendectomy, hip, tonsillectomy


LMP (females 10-50):  postmenopause





Social History


Alcohol Use:  none


Drug Use:  none





Reviewed


Nursing Reviewed:  Vital Signs, Abn. Noted





Review of Systems


All Other Systems:  Reviewed and Negative





Physical Exam


General Appearance:  No Apparent Distress


EENT:  eyes nml inspection


Neck:  Non-Tender


Respiratory:  rales


CVS:  reg rate & rhythm


Gastrointestinal:  Normal Bowel Sounds


Back:  Normal Inspection


Extremities:  Normal Range of Motion


Neurologic/Psychiatric:  CNs II-XII NML as Tested


Skin:  Normal Color


Lymphatic:  No Adenopathy





Results/Orders


Results/Orders





Orders - RIO LADD MD


Cbc With Auto Diff (8/16/20 18:01)


Comprehensive Metabolic Panel (8/16/20 18:01)


Creatine Kinase (8/16/20 18:01)


Creatine Kinase Mb (8/16/20 18:01)


Troponin I (8/16/20 18:01)


Probnp    B-Type Np (8/16/20 18:01)


PT (8/16/20 18:01)


Partial Thromboplastin Time. (8/16/20 18:01)


Helicobacter Pylori (8/16/20 18:01)


D-Dimer (8/16/20 18:01)


Xr Chest 1v (8/16/20 18:01)


Ekg-Routine (8/16/20 18:01)


Urinalysis (8/16/20 18:02)


Strep Screen (8/16/20 18:04)


Influenza A&B (8/16/20 18:04)


Novel Coronavirus 2019([facility]) (8/16/20 18:04)


Blood Culture (8/16/20 18:37)


Lactic Acid(Ml) (8/16/20 18:42)





Vital Signs








  Date Time  Temp Pulse Resp B/P (MAP) Pulse Ox O2 Delivery O2 Flow Rate FiO2


 


8/16/20 17:33 99.5 96 22     


 


8/16/20 17:33 99.5 96 18  95   


 


8/16/20 17:33 99.5 96 22 124/47 (72) 95 Room Air  








                                Laboratory Tests








Test


 8/16/20


18:00


 


White Blood Count


 4.5 10^3/uL


(4.5-11.0)


 


Red Blood Count


 4.30 10^6/uL


(4.00-5.20)


 


Hemoglobin


 12.3 g/dL


(12.0-15.0)


 


Hematocrit


 38.0 %


(36.0-46.0)


 


Mean Corpuscular Volume


 88.4 fL


()


 


Mean Corpuscular Hemoglobin


 28.6 pg


(26-34)


 


Mean Corpuscular Hemoglobin


Concent 32.4 g/dL


(33-36.5)  L


 


Red Cell Distribution Width


 13.3 %


(11.5-14.5)


 


Platelet Count


 212 10^3/uL


(150-400)


 


Mean Platelet Volume


 10.2 fL


(7.8-11.0)


 


Neutrophils (%) (Auto)


 66.3 %


(41.0-85.0)


 


Lymphocytes (%) (Auto)


 20.7 %


(24.0-44.0)  L


 


Monocytes (%) (Auto)


 11.5 %


(5.0-12.0)


 


Neutrophils # (Auto)


 3.0 10^3/uL


(1.8-7.7)


 


Lymphocytes # (Auto)


 0.94 10^3/uL1


(1.0-4.8)  L


 


Monocytes # (Auto)


 0.5 10^3/uL


(0.3-0.8)


 


Absolute Immature Granulocyte


(auto 0.02 10^3 u/L


(0-2)


 


Absolute Eosinophils (auto)


 0.0 10^3/uL


(0.0-0.2)


 


Immature Granulocytes %


 0.40 %


(0.00-0.50)


 


Eosinophils %


 0.9 %


(0.0-5.0)


 


Basophils %


 0.2 %


(0.0-0.2)


 


Basophils #


 0.0 10^3/uL


(0.0-0.1)


 


Prothrombin Time


 9.7 SEC


(9.3-11.3)


 


Prothrombin Time INR


(Non-Therap) 1.0  





 


Activated Partial


Thromboplast Time 29.2 SEC


(24.67-30.72)


 


D-Dimer


 0.57 mg/L


(0.19-0.49)  *H


 


Sodium Level


 139 mmol/L


(132-145)


 


Potassium Level


 3.9 mmol/L


(3.6-5.2)


 


Chloride Level


 99.0 mmol/L


()


 


Carbon Dioxide Level


 29.1 mmol/L


(20.0-32)


 


Anion Gap 14.8  


 


Blood Urea Nitrogen


 15 mg/dL


(7-18)


 


Creatinine


 0.65 mg/dL


(0.59-1.40)


 


Estimated GFR (


American) 106.1 (>/=60)  





 


Est GFR (CKD-EPI)(Non-Afr


American) 87.7 (>/=60)  





 


BUN/Creatinine Ratio 23.0  


 


Glucose Level


 90 mg/dL


()


 


Calcium Level


 8.9 mg/dL


(8.4-10.5)


 


Total Bilirubin


 0.4 mg/dL


(0.2-1.0)


 


Aspartate Amino Transferase


(AST) 28 U/L (0-35)  





 


Alanine Aminotransferase (ALT)


 23 U/L (12-78)





 


Alkaline Phosphatase


 45 U/L


()  L


 


Total Creatine Kinase


 41 U/L


()


 


Creatine Kinase MB


 0.5 ng/mL


(0.5-3.6)


 


Troponin I


 < 0.02 ng/mL


(0.00-0.05)


 


Pro-B-Type Natriuretic Peptide


 124 pg/mL


(0-450)


 


Total Protein


 7.2 g/dL


(6.4-8.2)


 


Albumin


 3.2 g/dL


(3.4-5.0)  L


 


Globulin 4.0  


 


Helicobacter pylori Screen


 NEGATIVE


(NEGATIVE)











Progress


Progress


TO DR MILLAN





EKG/XRAY/CT/US


EKG:  NSR, no ST T wave changes





Departure


Time of Disposition:  19:00


Disposition:  01 HOME, SELF-CARE


Impression:  


   Primary Impression:  


   COPD (chronic obstructive pulmonary disease)


Condition:  Improved


Referrals:  


KRISTAL BUCHANAN (PCP)


PRIMARY CARE PROVIDER


Duration or Time Spent with Pa:  RIO MELVIN MD              Aug 16, 2020 18:27

## 2020-08-17 ENCOUNTER — HOSPITAL ENCOUNTER (EMERGENCY)
Dept: HOSPITAL 65 - ER | Age: 80
Discharge: HOME | End: 2020-08-17
Payer: MEDICARE

## 2020-08-17 VITALS — HEIGHT: 60 IN | WEIGHT: 112 LBS | BODY MASS INDEX: 21.99 KG/M2

## 2020-08-17 VITALS — SYSTOLIC BLOOD PRESSURE: 133 MMHG | DIASTOLIC BLOOD PRESSURE: 67 MMHG

## 2020-08-17 VITALS — SYSTOLIC BLOOD PRESSURE: 133 MMHG | DIASTOLIC BLOOD PRESSURE: 63 MMHG

## 2020-08-17 DIAGNOSIS — E11.9: ICD-10-CM

## 2020-08-17 DIAGNOSIS — U07.1: Primary | ICD-10-CM

## 2020-08-17 DIAGNOSIS — E78.00: ICD-10-CM

## 2020-08-17 DIAGNOSIS — J44.1: ICD-10-CM

## 2020-08-17 DIAGNOSIS — I10: ICD-10-CM

## 2020-08-17 DIAGNOSIS — Z88.1: ICD-10-CM

## 2020-08-17 DIAGNOSIS — K21.9: ICD-10-CM

## 2020-08-17 DIAGNOSIS — Z79.899: ICD-10-CM

## 2020-08-17 LAB
ALP INTEST CFR SERPL: 40 U/L (ref 50–136)
ALT SERPL-CCNC: 20 U/L (ref 12–78)
AST SERPL-CCNC: 24 U/L (ref 0–35)
BASOPHILS # BLD AUTO: 0 10^3/UL (ref 0–0.1)
BASOPHILS NFR BLD AUTO: 0.2 % (ref 0–0.2)
CALCIUM SERPL-MCNC: 8.8 MG/DL (ref 8.4–10.5)
CO2 BLDA-SCNC: 28.5 MMOL/L (ref 20–32)
EOSINOPHIL # BLD AUTO: 0 10^3/UL (ref 0–0.2)
EOSINOPHIL NFR BLD AUTO: 0.2 % (ref 0–5)
ERYTHROCYTE [DISTWIDTH] IN BLOOD BY AUTOMATED COUNT: 13.3 % (ref 11.5–14.5)
GLUCOSE PRE 100 G GLC PO SERPL-MCNC: 103 MG/DL (ref 70–110)
LYMPHOCYTES # BLD AUTO: 0.77 10^3/UL1 (ref 1–4.8)
LYMPHOCYTES NFR BLD AUTO: 16.3 % (ref 24–44)
MCH RBC QN AUTO: 28.3 PG (ref 26–34)
MONOCYTES # BLD AUTO: 0.5 10^3/UL (ref 0.3–0.8)
MONOCYTES NFR BLD AUTO: 10.4 % (ref 5–12)
NEUTROPHILS # BLD AUTO: 3.4 10^3/UL (ref 1.8–7.7)
NEUTROPHILS NFR BLD AUTO: 72.7 % (ref 41–85)
PLATELET # BLD AUTO: 195 10^3/UL (ref 150–400)

## 2020-08-17 PROCEDURE — A4216 STERILE WATER/SALINE, 10 ML: HCPCS

## 2020-08-17 PROCEDURE — 82550 ASSAY OF CK (CPK): CPT

## 2020-08-17 PROCEDURE — 80053 COMPREHEN METABOLIC PANEL: CPT

## 2020-08-17 PROCEDURE — 82728 ASSAY OF FERRITIN: CPT

## 2020-08-17 PROCEDURE — 93005 ELECTROCARDIOGRAM TRACING: CPT

## 2020-08-17 PROCEDURE — 82553 CREATINE MB FRACTION: CPT

## 2020-08-17 PROCEDURE — 87635 SARS-COV-2 COVID-19 AMP PRB: CPT

## 2020-08-17 PROCEDURE — 85730 THROMBOPLASTIN TIME PARTIAL: CPT

## 2020-08-17 PROCEDURE — 85025 COMPLETE CBC W/AUTO DIFF WBC: CPT

## 2020-08-17 PROCEDURE — 83615 LACTATE (LD) (LDH) ENZYME: CPT

## 2020-08-17 PROCEDURE — 71045 X-RAY EXAM CHEST 1 VIEW: CPT

## 2020-08-17 PROCEDURE — 84484 ASSAY OF TROPONIN QUANT: CPT

## 2020-08-17 PROCEDURE — 99285 EMERGENCY DEPT VISIT HI MDM: CPT

## 2020-08-17 PROCEDURE — 85379 FIBRIN DEGRADATION QUANT: CPT

## 2020-08-17 PROCEDURE — 86140 C-REACTIVE PROTEIN: CPT

## 2020-08-17 PROCEDURE — 83880 ASSAY OF NATRIURETIC PEPTIDE: CPT

## 2020-08-17 PROCEDURE — 85610 PROTHROMBIN TIME: CPT

## 2020-08-17 PROCEDURE — 96374 THER/PROPH/DIAG INJ IV PUSH: CPT

## 2020-08-17 NOTE — ER.PDOC
General


Chief Complaint:  Requesting Medical Care


Stated Complaint:  SOB


Time seen by MD:  12:00


Source:  patient, EMS


Exam Limitations:  no limitations





History of Present Illness


Initial Comments


Patient c/o persistent SOB.  Seen here last night for same and was unable to 

sleep all night d/t dyspnea.  Denies fever or cough.  She did test + COVID19 end

of July after a brief stay at a rehab ctr d/t fx LUE.


Timing/Duration:  24 hours


Severity:  moderate


Activities at Onset:  rest


Prior Episodes/Possible Cause:  occasional episodes


Associated Symptoms:  denies symptoms (no CP, no cough, no fever)


Prior symptoms/Treatment:  Recenly Seen (here last night)


Allergies:  


Coded Allergies:  


     ciprofloxacin (Verified  Allergy, Intermediate, 8/17/20)


Home Meds


Reported Medications


Losartan Potassium (LOSARTAN POTASSIUM) 25 Mg Tablet, 1 TAB PO DAILY, #90 TAB 1 

Refill


   5/17/20


Metformin Hcl (METFORMIN HCL) 500 Mg Tablet, 500 MG PO DAILY24, TAB


   5/17/20


Atorvastatin 10MG (LIPITOR 10MG) 10 Mg Tablet, 1 TAB PO HS, #90 TAB 1 Refill


   5/17/20


Allopurinol (ALLOPURINOL) 100 Mg Tablet, 1 TAB PO DAILY, #30 TAB 5 Refills


   5/17/20


Amlodipine Besylate (AMLODIPINE BESYLATE) 5 Mg Tablet, 1 TAB PO DAILY, #30 TAB 5

Refills


   5/17/20


Hydrochlorothiazide (HYDROCHLOROTHIAZIDE) 25 Mg Tablet, 1 TAB PO DAILY, #30 TAB 

5 Refills


   5/17/20


Albuterol Sulfate (ALBUTEROL SULFATE) 2 Mg/5 Ml Syrup, 2 MG PO PRN, ML


   5/17/20





Past Medical History


Medical History:  COPD, diabetes, GERD, high cholesterol, hypertension


Surgical History:  appendectomy, hip, tonsillectomy





Family History


Significant Family History:  no pertinent family hx





Social History


Smoking:  non-smoker (never smoker)


Alcohol Use:  none


Drug Use:  none





Review of Systems


Constitutional:  no symptoms reported


EENTM:  no symptoms reported


Respiratory:  shortness of breath


Cardiovascular:  no symptoms reported


Gastrointestinal:  no symptoms reported


Musculoskeletal:  no symptoms reported


Skin:  no symptoms reported


Psychiatric/Neurological:  no symptoms reported


Endocrine:  no symptoms reported


Hematologic/Lymphatic:  no symptoms reported





Physical Exam


General Appearance:  No Apparent Distress, WD/WN


HEENT:  PERRL/EOMI, Normal ENT Inspection


Respiratory:  no respiratory distress, rhonchi (right)


Cardiovascular:  Regular Rate, Rhythm, No JVD, No Murmur


Gastrointestinal:  Normal Bowel Sounds, No Pulsatile Mass, Non Tender


Extremities:  Non-Tender, No Pedal Edema, No Calf Tenderness


Neurologic/Psychiatric:  Alert, Normal Mood/Affect, Oriented x 3


Skin:  Normal Color, Warm/Dry


Lymphatic:  No Adenopathy





Results/Orders


Results/Orders





Orders - ADAM HANCOCK DO


Cbc With Auto Diff (8/17/20 12:14)


Comprehensive Metabolic Panel (8/17/20 12:14)


Creatine Kinase (8/17/20 12:14)


Creatine Kinase Mb (8/17/20 12:14)


Probnp    B-Type Np (8/17/20 12:14)


Troponin I (8/17/20 12:14)


D-Dimer (8/17/20 12:14)


PT (8/17/20 12:14)


Partial Thromboplastin Time. (8/17/20 12:14)


Ekg-Routine (8/17/20 12:14)


Xr Chest 1v (8/17/20 12:14)


Saline Lock (8/17/20 12:14)


Novel Coronavirus 2019([facility]) (8/17/20 12:14)


Ferritin(Ml) (8/17/20 12:14)


Lactate Dehydrogenase (8/17/20 12:14)


C-Reactive Protein (8/17/20 12:14)





Vital Signs








  Date Time  Temp Pulse Resp B/P (MAP) Pulse Ox O2 Delivery O2 Flow Rate FiO2


 


8/17/20 12:45  88 18 133/67 (89) 98 Nasal Canula 3.00 


 


8/17/20 12:17 98.7 88 20     


 


8/17/20 12:17 98.7 88 20     








                                Laboratory Tests








Test


 8/17/20


12:30


 


White Blood Count


 4.7 10^3/uL


(4.5-11.0)


 


Red Blood Count


 4.14 10^6/uL


(4.00-5.20)


 


Hemoglobin


 11.7 g/dL


(12.0-15.0)  L


 


Hematocrit


 36.4 %


(36.0-46.0)


 


Mean Corpuscular Volume


 87.9 fL


()


 


Mean Corpuscular Hemoglobin


 28.3 pg


(26-34)


 


Mean Corpuscular Hemoglobin


Concent 32.1 g/dL


(33-36.5)  L


 


Red Cell Distribution Width


 13.3 %


(11.5-14.5)


 


Platelet Count


 195 10^3/uL


(150-400)


 


Mean Platelet Volume


 9.9 fL


(7.8-11.0)


 


Neutrophils (%) (Auto)


 72.7 %


(41.0-85.0)


 


Lymphocytes (%) (Auto)


 16.3 %


(24.0-44.0)  L


 


Monocytes (%) (Auto)


 10.4 %


(5.0-12.0)


 


Neutrophils # (Auto)


 3.4 10^3/uL


(1.8-7.7)


 


Lymphocytes # (Auto)


 0.77 10^3/uL1


(1.0-4.8)  L


 


Monocytes # (Auto)


 0.5 10^3/uL


(0.3-0.8)


 


Absolute Immature Granulocyte


(auto 0.01 10^3 u/L


(0-2)


 


Absolute Eosinophils (auto)


 0.0 10^3/uL


(0.0-0.2)


 


Immature Granulocytes %


 0.20 %


(0.00-0.50)


 


Eosinophils %


 0.2 %


(0.0-5.0)


 


Basophils %


 0.2 %


(0.0-0.2)


 


Basophils #


 0.0 10^3/uL


(0.0-0.1)


 


Prothrombin Time


 9.7 SEC


(9.3-11.3)


 


Prothrombin Time INR


(Non-Therap) 1.0  





 


Activated Partial


Thromboplast Time 30.1 SEC


(24.67-30.72)


 


D-Dimer


 0.54 mg/L


(0.19-0.49)  *H


 


Sodium Level


 137 mmol/L


(132-145)


 


Potassium Level


 3.6 mmol/L


(3.6-5.2)


 


Chloride Level


 98.0 mmol/L


()


 


Carbon Dioxide Level


 28.5 mmol/L


(20.0-32)


 


Anion Gap 14.1  


 


Blood Urea Nitrogen


 11 mg/dL


(7-18)


 


Creatinine


 0.47 mg/dL


(0.59-1.40)  L


 


Estimated GFR (


American) 154.3 (>/=60)  





 


Est GFR (CKD-EPI)(Non-Afr


American) 127.5 (>/=60)  





 


BUN/Creatinine Ratio 23.0  


 


Glucose Level


 103 mg/dL


()


 


Calcium Level


 8.8 mg/dL


(8.4-10.5)


 


Ferritin


 543 ng/mL


(8-252)  H


 


Total Bilirubin


 0.4 mg/dL


(0.2-1.0)


 


Aspartate Amino Transferase


(AST) 24 U/L (0-35)  





 


Alanine Aminotransferase (ALT)


 20 U/L (12-78)





 


Alkaline Phosphatase


 40 U/L


()  L


 


Lactate Dehydrogenase


 169 U/L


()


 


Total Creatine Kinase


 27 U/L


()


 


Creatine Kinase MB


 0.5 ng/mL


(0.5-3.6)


 


Troponin I


 < 0.02 ng/mL


(0.00-0.05)


 


C-Reactive Protein


 5.45 mg/dL


(0.00-5.00)  H


 


Pro-B-Type Natriuretic Peptide


 117 pg/mL


(0-450)


 


Total Protein


 6.9 g/dL


(6.4-8.2)


 


Albumin


 3.1 g/dL


(3.4-5.0)  L


 


Globulin 3.8  











Progress


Progress


ferritin and CRP elevated; dDimer very slightly elevated; WBC normal; oxygen 

saturation here is 98-99% on 3 L (patient has oxygen at home); patient does not 

meet criterion for admission at this time.





EKG/XRAY/CT/US


EKG:  NSR, no ST T wave changes


XRAY:  chest (no infiltrate seen)





Departure


Time of Disposition:  14:40


Disposition:  01 HOME, SELF-CARE


Impression:  


   Primary Impression:  


   Dyspnea


   Additional Impression:  


   COPD with exacerbation


Condition:  Stable


Patient Instructions:  Chronic Obstructive Pulmonary Disease, Shortness of Br

[facility]


Referrals:  


KRISTAL BUCHANAN (PCP)


PRIMARY CARE PROVIDER





Additional Instructions:  


Follow up with your doctor later this week for reevaluation.





Take steroids as prescribed until all gone.





Use your home oxygen at 3L/minute.


Duration or Time Spent with Pa:  15 min





Problem Qualifiers








   Primary Impression:  


   Dyspnea


   Dyspnea type:  shortness of breath  Qualified Codes:  R06.02 - Shortness of 

   breath








ADAM HANCOCK DO            Aug 17, 2020 12:11

## 2020-08-17 NOTE — PCM.EKG
[facility]

                                       

Test Date:    2020               Test Time:    12:11:10

Pat Name:     SUSHANT CRAIG               Department:   

Patient ID:   [facility]-[id number]          Room:          

Gender:       F                        Technician:   RT

:          1940               Requested By: ADAM HANCOCK

Order Number: [id number]           Reading MD:     

                                 Measurements

Intervals                              Axis          

Rate:         86                       P:            76

VT:           182                      QRS:          67

QRSD:         90                       T:            61

QT:           370                                    

QTc:          443                                    

                           Interpretive Statements

Sinus rhythm

Low voltage with right axis deviation

Consider anterior infarct

Baseline wander in lead(s) II,III,aVF

Compared to ECG 2020 18:13:54

No significant changes



Please click the below link to view image of tracing.

## 2020-08-17 NOTE — DIREP
PROCEDURE:CHEST 1 VIEW

 

COMPARISON:[facility], CR, XRAY CHEST SINGLE VW, 08/16/2020, 

05:54 PM.

 

INDICATIONS:dyspnea

 

FINDINGS:

LUNGS/PLEURA: 

VASCULATURE:Normal.  Unremarkable pulmonary vasculature.  No air bronchograms 

or ground-glass infiltrates are seen.  No pneumonia, heart failure or effusions 

are seen.  Aorta is mildly tortuous.

CARDIAC:Normal.  No cardiac silhouette abnormality or cardiomegaly. 

MEDIASTINUM:Normal.  No visible mass or adenopathy. 

BONES:Listen to or healing fracture of the left proximal humerus noted.  Small 

bony fragments identified medial to the left humeral head.  DJD in the 

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

OTHER:Negative.  

 

CONCLUSION:No definite pneumonia or ground-glass infiltrates are seen.  

Subacute or healing fracture of the left proximal humerus.

 

 

 

Dictated by: Spencer Hayden MD on 08/17/2020 at 12:48 PM     

Electronically Signed By: Spencer Hayden MD on 08/17/2020 at 12:50 PM

## 2020-12-01 ENCOUNTER — HOSPITAL ENCOUNTER (OUTPATIENT)
Dept: HOSPITAL 65 - RAD | Age: 80
Discharge: HOME | End: 2020-12-01
Payer: MEDICARE

## 2020-12-01 DIAGNOSIS — I31.3: ICD-10-CM

## 2020-12-01 DIAGNOSIS — I25.10: Primary | ICD-10-CM

## 2020-12-01 DIAGNOSIS — R91.1: ICD-10-CM

## 2020-12-01 PROCEDURE — 82565 ASSAY OF CREATININE: CPT

## 2020-12-01 PROCEDURE — 71260 CT THORAX DX C+: CPT

## 2020-12-01 PROCEDURE — 36415 COLL VENOUS BLD VENIPUNCTURE: CPT

## 2020-12-01 NOTE — DIREP
PROCEDURE:CT CHEST WITH CONTRAST

 

TECHNIQUE:Following the intravenous administration of contrast material, axial 

cuts were obtained through the chest.  The images were viewed at lung and soft 

tissue settings.  Sagittal and coronal reconstructions are provided.   

 

COMPARISON:[facility], CT, CT CHEST W&W/O, 01/21/2019, 09:37 

AM.  [facility], CT, CT CHEST W/O, 05/17/2020, 10:59 PM.

 

INDICATIONS:SOLITARY PULMONARY NODULE R91.1

 

FINDINGS:

LUNGS:There are multiple small pulmonary nodules.  Largest nodule is in the 

right middle lobe measures 6.6 mm series 4, image 32.  These are unchanged from 

previous studies.  Small intercostal lung protrusion left posterior lateral 

lower debbie thorax series 4, image 36 unchanged from prior studies.

CARDIAC:Heart size is enlarged.  Pericardium is thickened measuring 9 mm in 

thickness unchanged from prior studies.

THORACIC AORTA:Heavily calcified.

MEDIASTINUM/SOFI:Small hiatal hernia.

PLEURA:Normal.

CHEST WALL:Normal.

LIMITED ABDOMEN:Normal.

BONES:Normal..

THYROID:Normal.

OTHER:No additional findings. 

 

CONCLUSION:

 

1.  Stable appearance of multiple pulmonary nodules.  No further follow-up 

advised at this time.

 

2.  Pericardial effusion unchanged.

 

3.  Atherosclerosis.

 

 

 

 

 

Dictated by: Ugo Lopez M.D. on 12/01/2020 at 02:05 PM     

Electronically Signed By: Ugo Lopez M.D. on 12/01/2020 at 02:21 PM

## 2021-02-02 ENCOUNTER — HOSPITAL ENCOUNTER (OUTPATIENT)
Dept: HOSPITAL 65 - NPLAB | Age: 81
Discharge: HOME | End: 2021-02-02
Attending: NURSE PRACTITIONER
Payer: MEDICARE

## 2021-02-02 DIAGNOSIS — E11.65: Primary | ICD-10-CM

## 2021-02-02 PROCEDURE — 81003 URINALYSIS AUTO W/O SCOPE: CPT

## 2021-02-02 PROCEDURE — 82043 UR ALBUMIN QUANTITATIVE: CPT

## 2021-06-11 ENCOUNTER — HOSPITAL ENCOUNTER (OUTPATIENT)
Dept: HOSPITAL 65 - RT | Age: 81
Discharge: HOME | End: 2021-06-11
Attending: INTERNAL MEDICINE
Payer: MEDICARE

## 2021-06-11 DIAGNOSIS — I31.3: ICD-10-CM

## 2021-06-11 DIAGNOSIS — I08.1: Primary | ICD-10-CM

## 2021-06-11 DIAGNOSIS — I87.2: ICD-10-CM

## 2021-06-11 DIAGNOSIS — I11.9: ICD-10-CM

## 2021-06-11 PROCEDURE — 93306 TTE W/DOPPLER COMPLETE: CPT

## 2021-06-11 NOTE — PCM.ECHO
--------------- APPROVED REPORT --------------





EXAM: Comprehensive 2D, Doppler, and color-flow Echocardiogram.



Patient Location: OUT-PATIENT



Indications

Hypertension/HDD



2D Dimensions

LVOT Diameter            2.09 (1.8-2.4cm)     LVEF(%)                  53.69 (>50%)         























M-Mode Dimensions

RVDd                     1.50 (2.1-3.2cm)     Left Atrium(MM)          3.05 (2.5-4.0cm)     

IVSd                     1.10 (0.7-1.1cm)     Aortic Root              2.10 (2.2-3.7cm)     

LVDd                     4.05 (4.0-5.6cm)     Aortic Cusp Exc          1.70 (1.5-2.0cm)     

PWd                      0.95 (0.7-1.1cm)     MV EPSS                  0.39 (<0.5cm)        

IVSs                     2.05 cm              FS (%)                   54.05 %              

LVDs                     1.85 (2.0-3.8cm)     ESV(Teich)               10.68 ml             

PWs                      1.10 cm              LVEF(%)                  85.31 (>50%)         





Volumes

Biplane 2D LV Volumes                               Biplane 2D LA Volumes                   





LVEDv A4C                           44.48 mL        LA ESV Index                            

LVESv A4C                           32.43 mL                                                





Aortic Valve

AoV Peak Jesus.           1.70 m/s              AoV VTI                 31.60 cm              

AO Peak GR.             11.55 mmHg            AO Mean GR.             5.85 mmHg             

LVOT  VTI               23.10 cm              LVOT Peak Jesus.          0.79 m/s              

GUILLE(VTI)/BSA            2.50 cm2/m2           GUILLE   (VTI)             2.50 cm2              







Mitral Valve

MV E Velocity        0.70m/s                  MR Peak Gr.          19.90mmHg                

MV A Velocity        1.05m/s                                                                











TDI

Lateral E' P. V         0.08m/s               Medial E' P. V          0.11m/s               









Pulmonary Valve

PV Peak Velocity        0.85m/s               PV Peak Grad.           3.15mmHg              

RVOT VTI                19.48cm                                                             



Tricuspid Valve

TR P. Velocity          2.50m/s               RAP ESTIMATE            10.00mmHg             

TR Peak Gr.             25.92mmHg             RVSP                    35.92mmHg             





 LEFT VENTRICLE 

The left ventricle is normal size. The left ventricular systolic function is normal. The left 

ventricular ejection fraction is within the normal range. Moderate concentric left ventricular 

hypertrophy. There is normal LV segmental wall motion. There is no ventricular septal defect 

visualized. No left ventricle thrombus noted on this study. LVEF is 60-65%.



 RIGHT VENTRICLE 

The right ventricle is normal size. The right ventricular systolic function is normal. There is 

normal right ventricular wall thickness.



 ATRIA 

The left atrium size is normal. The right atrium size is normal. The interatrial septum is intact 

with no evidence for an atrial septal defect.



 AORTIC VALVE 

The aortic valve is normal in structure. There is no aortic valvular stenosis. No aortic 

regurgitation is present. There is no aortic valvular vegetation.



 MITRAL VALVE 

The mitral valve is normal in structure. There is no mitral valve stenosis. Mild mitral 

regurgitation. There is no evidence of mitral valve vegetations.



 TRICUSPID VALVE 

The tricuspid valve is normal in structure. There is no tricuspid valve stenosis. Mild tricuspid 

regurgitation. There is no tricuspid valve vegetations.



 PULMONIC VALVE 

Pulmonic valve is not well visualized. There is no pulmonic valvular stenosis. There is no pulmonic 

valvular regurgitation. 



 GREAT VESSELS 

The aortic root is normal in size. Pulmonary artery is not well visualized. Aortic arch is not well 

visualized. The IVC is normal in size and collapses >50% with inspiration.



 PERICARDIUM

Moderate circumferential pericardial effusion. No echo indications of pericardial tamponade.



Other Information 

Study Quality: Fair



<Conclusion>

The left ventricular systolic function is normal.

LVEF is 60-65%.

Moderate concentric left ventricular hypertrophy.

Mild mitral regurgitation.

Mild tricuspid regurgitation.

Moderate circumferential pericardial effusion.

No echo indications of pericardial tamponade.





Electronically signed by : SANDRO HANSON.   06/11/2021 13:59:32

## 2021-06-17 ENCOUNTER — HOSPITAL ENCOUNTER (OUTPATIENT)
Dept: HOSPITAL 65 - RAD | Age: 81
Discharge: HOME | End: 2021-06-17
Attending: NURSE PRACTITIONER
Payer: MEDICARE

## 2021-06-17 DIAGNOSIS — I87.2: Primary | ICD-10-CM

## 2021-06-17 DIAGNOSIS — I10: ICD-10-CM

## 2021-06-17 PROCEDURE — 93970 EXTREMITY STUDY: CPT

## 2021-06-18 NOTE — PRP
DATE OF PROCEDURE: 06/17/2021

DICTATOR NAME: SANDRO HANSON DO

VENOUS MAPPING ULTRASOUND



INDICATION:  Chronic venous insufficiency.



RIGHT LOWER EXTREMITY:  The proximal to distal segment of the right greater 

saphenous vein is not visualized.  The right small saphenous vein measures 3 mm 

in its maximum diameter.  Significant reflux is noted in the right small 

saphenous vein with maximum reflux of 7.2 seconds.  There is no evidence of deep

venous thrombosis in the right lower extremity.



LEFT LOWER EXTREMITY:  The left greater saphenous vein measures 7 mm in its 

maximum diameter.  Significant reflux is noted in the left greater saphenous 

vein with maximum reflux of 7.2 seconds.  The left small saphenous vein measures

3 mm in its maximum diameter.  Significant reflux is noted in the left small 

saphenous vein with maximum reflux of 0.7 seconds.  There is no evidence of deep

venous thrombosis in the left lower extremity.



IMPRESSION:

1.  The right small saphenous vein is normal sized but displays pathological 

reflux.

2.  The left greater saphenous vein is normal sized but displays pathological 

reflux.

3.  The left small saphenous vein is normal sized but displays pathological 

reflux.

4.  The right GSV is nonvisualized.

5.  There is no evidence of deep venous thrombosis in the bilateral lower 

extremities.



RECOMMENDATIONS:  Conservative measures including the use of compression 

stockings, leg elevation and exercise are recommended if clinically indicated.









Peace SAENZ D.O. DR: SUNIL CRUZ: [phone] RECEIPT: [id number]

DD: 06/18/2021 10:45 AM

DT: 06/18/2021 09:52 PM